# Patient Record
Sex: FEMALE | Race: WHITE | NOT HISPANIC OR LATINO | Employment: FULL TIME | URBAN - METROPOLITAN AREA
[De-identification: names, ages, dates, MRNs, and addresses within clinical notes are randomized per-mention and may not be internally consistent; named-entity substitution may affect disease eponyms.]

---

## 2020-06-25 ENCOUNTER — LAB VISIT (OUTPATIENT)
Dept: PRIMARY CARE CLINIC | Facility: OTHER | Age: 35
End: 2020-06-25

## 2020-06-25 DIAGNOSIS — R11.0 NAUSEA: Primary | ICD-10-CM

## 2020-06-25 PROCEDURE — U0003 INFECTIOUS AGENT DETECTION BY NUCLEIC ACID (DNA OR RNA); SEVERE ACUTE RESPIRATORY SYNDROME CORONAVIRUS 2 (SARS-COV-2) (CORONAVIRUS DISEASE [COVID-19]), AMPLIFIED PROBE TECHNIQUE, MAKING USE OF HIGH THROUGHPUT TECHNOLOGIES AS DESCRIBED BY CMS-2020-01-R: HCPCS

## 2020-06-28 LAB — SARS-COV-2 RNA RESP QL NAA+PROBE: NOT DETECTED

## 2021-04-26 ENCOUNTER — PATIENT MESSAGE (OUTPATIENT)
Dept: RESEARCH | Facility: HOSPITAL | Age: 36
End: 2021-04-26

## 2021-08-06 ENCOUNTER — OFFICE VISIT (OUTPATIENT)
Dept: URGENT CARE | Facility: CLINIC | Age: 36
End: 2021-08-06
Payer: COMMERCIAL

## 2021-08-06 VITALS
SYSTOLIC BLOOD PRESSURE: 116 MMHG | HEART RATE: 78 BPM | DIASTOLIC BLOOD PRESSURE: 74 MMHG | OXYGEN SATURATION: 98 % | RESPIRATION RATE: 18 BRPM | TEMPERATURE: 98 F

## 2021-08-06 DIAGNOSIS — Z32.02 PREGNANCY EXAMINATION OR TEST, NEGATIVE RESULT: ICD-10-CM

## 2021-08-06 DIAGNOSIS — R51.9 ACUTE NONINTRACTABLE HEADACHE, UNSPECIFIED HEADACHE TYPE: ICD-10-CM

## 2021-08-06 DIAGNOSIS — J02.9 SORE THROAT: Primary | ICD-10-CM

## 2021-08-06 LAB
B-HCG UR QL: NEGATIVE
CTP QC/QA: YES
MOLECULAR STREP A: NEGATIVE
SARS-COV-2 RDRP RESP QL NAA+PROBE: NEGATIVE

## 2021-08-06 PROCEDURE — 3078F DIAST BP <80 MM HG: CPT | Mod: CPTII,S$GLB,, | Performed by: NURSE PRACTITIONER

## 2021-08-06 PROCEDURE — U0002 COVID-19 LAB TEST NON-CDC: HCPCS | Mod: QW,S$GLB,, | Performed by: NURSE PRACTITIONER

## 2021-08-06 PROCEDURE — 1160F RVW MEDS BY RX/DR IN RCRD: CPT | Mod: CPTII,S$GLB,, | Performed by: NURSE PRACTITIONER

## 2021-08-06 PROCEDURE — 3074F PR MOST RECENT SYSTOLIC BLOOD PRESSURE < 130 MM HG: ICD-10-PCS | Mod: CPTII,S$GLB,, | Performed by: NURSE PRACTITIONER

## 2021-08-06 PROCEDURE — 3074F SYST BP LT 130 MM HG: CPT | Mod: CPTII,S$GLB,, | Performed by: NURSE PRACTITIONER

## 2021-08-06 PROCEDURE — 3078F PR MOST RECENT DIASTOLIC BLOOD PRESSURE < 80 MM HG: ICD-10-PCS | Mod: CPTII,S$GLB,, | Performed by: NURSE PRACTITIONER

## 2021-08-06 PROCEDURE — U0002: ICD-10-PCS | Mod: QW,S$GLB,, | Performed by: NURSE PRACTITIONER

## 2021-08-06 PROCEDURE — 81025 URINE PREGNANCY TEST: CPT | Mod: S$GLB,,, | Performed by: NURSE PRACTITIONER

## 2021-08-06 PROCEDURE — 1160F PR REVIEW ALL MEDS BY PRESCRIBER/CLIN PHARMACIST DOCUMENTED: ICD-10-PCS | Mod: CPTII,S$GLB,, | Performed by: NURSE PRACTITIONER

## 2021-08-06 PROCEDURE — 1159F MED LIST DOCD IN RCRD: CPT | Mod: CPTII,S$GLB,, | Performed by: NURSE PRACTITIONER

## 2021-08-06 PROCEDURE — 87651 POCT STREP A MOLECULAR: ICD-10-PCS | Mod: QW,S$GLB,, | Performed by: NURSE PRACTITIONER

## 2021-08-06 PROCEDURE — 87651 STREP A DNA AMP PROBE: CPT | Mod: QW,S$GLB,, | Performed by: NURSE PRACTITIONER

## 2021-08-06 PROCEDURE — 99203 OFFICE O/P NEW LOW 30 MIN: CPT | Mod: S$GLB,CS,, | Performed by: NURSE PRACTITIONER

## 2021-08-06 PROCEDURE — 99203 PR OFFICE/OUTPT VISIT, NEW, LEVL III, 30-44 MIN: ICD-10-PCS | Mod: S$GLB,CS,, | Performed by: NURSE PRACTITIONER

## 2021-08-06 PROCEDURE — 81025 POCT URINE PREGNANCY: ICD-10-PCS | Mod: S$GLB,,, | Performed by: NURSE PRACTITIONER

## 2021-08-06 PROCEDURE — 1159F PR MEDICATION LIST DOCUMENTED IN MEDICAL RECORD: ICD-10-PCS | Mod: CPTII,S$GLB,, | Performed by: NURSE PRACTITIONER

## 2021-08-06 RX ORDER — TRIAMCINOLONE ACETONIDE 1 MG/G
OINTMENT TOPICAL
COMMUNITY
Start: 2021-02-10

## 2021-11-01 ENCOUNTER — CLINICAL SUPPORT (OUTPATIENT)
Dept: OTHER | Facility: CLINIC | Age: 36
End: 2021-11-01
Payer: COMMERCIAL

## 2021-11-01 DIAGNOSIS — Z00.8 ENCOUNTER FOR OTHER GENERAL EXAMINATION: ICD-10-CM

## 2021-11-02 VITALS — HEIGHT: 66 IN

## 2021-11-02 LAB
GLUCOSE SERPL-MCNC: 98 MG/DL (ref 60–140)
HDLC SERPL-MCNC: 62 MG/DL
POC CHOLESTEROL, LDL (DOCK): 109 MG/DL
POC CHOLESTEROL, TOTAL: 180 MG/DL
TRIGL SERPL-MCNC: 46 MG/DL

## 2022-11-16 ENCOUNTER — CLINICAL SUPPORT (OUTPATIENT)
Dept: OTHER | Facility: CLINIC | Age: 37
End: 2022-11-16
Payer: COMMERCIAL

## 2022-11-16 DIAGNOSIS — Z00.8 ENCOUNTER FOR OTHER GENERAL EXAMINATION: ICD-10-CM

## 2022-11-17 VITALS
SYSTOLIC BLOOD PRESSURE: 104 MMHG | DIASTOLIC BLOOD PRESSURE: 68 MMHG | BODY MASS INDEX: 22.98 KG/M2 | WEIGHT: 143 LBS | HEIGHT: 66 IN

## 2022-11-17 LAB
GLUCOSE SERPL-MCNC: 86 MG/DL (ref 60–140)
HDLC SERPL-MCNC: 79 MG/DL
POC CHOLESTEROL, LDL (DOCK): 80 MG/DL
POC CHOLESTEROL, TOTAL: 172 MG/DL
TRIGL SERPL-MCNC: 65 MG/DL

## 2024-01-29 DIAGNOSIS — M76.62 TENDONITIS, ACHILLES, LEFT: Primary | ICD-10-CM

## 2024-02-20 ENCOUNTER — CLINICAL SUPPORT (OUTPATIENT)
Dept: REHABILITATION | Facility: HOSPITAL | Age: 39
End: 2024-02-20
Payer: COMMERCIAL

## 2024-02-20 DIAGNOSIS — M25.60 DECREASED MOBILITY OF JOINT: Primary | ICD-10-CM

## 2024-02-20 DIAGNOSIS — M76.62 TENDONITIS, ACHILLES, LEFT: ICD-10-CM

## 2024-02-20 PROCEDURE — 97161 PT EVAL LOW COMPLEX 20 MIN: CPT

## 2024-02-20 PROCEDURE — 97110 THERAPEUTIC EXERCISES: CPT

## 2024-02-20 PROCEDURE — 97140 MANUAL THERAPY 1/> REGIONS: CPT

## 2024-02-20 NOTE — PROGRESS NOTES
[facility]   Physical Therapy Initial Evaluation      Name: Peggy Sloan  [facility] Number: [id number]    Therapy Diagnosis:   Encounter Diagnoses   Name Primary?    Tendonitis, Achilles, left     Decreased mobility of joint Yes        Physician: Dawson Baig*  Physician Orders: PT Eval and Treat   Medical Diagnosis from Referral: M76.62 (ICD-10-CM) - Tendonitis, Achilles, left   Evaluation Date: 2/20/2024  Authorization Period Expiration: 1/28/2025  Plan of Care Expiration: 4/19/2024  Progress Note Due: 3/19/2024  Date of Surgery: None  Visit # / Visits authorized: 1/ 1   FOTO: 1/3:  Precautions: Standard     Time In: 10:05 am  Time Out: 11:00 am  Total Billable Time: 55 minutes    Subjective   Date of onset: Christmas 2023  History of current condition - Peggy Moe reports: a new onset of pain in the left Achilles (mid-portion) since Christmas. Peggy notes that the pain is worse towards the end of the day, as well as days w. She endorses that the pain does not bother her in the morning, but does bother her later in the day and with more walking. She denies that she has changed her activity level recently, stating that some of her worst days have been with more sitting or standing than walking. She denies a history of psoriatic arthritis, diabetes mellitus, or having recently changed her shoes.  Falls: None  Imaging: none    Prior Therapy: None  Social History:  Lives with their spouse  Occupation: Therapist  Prior Level of Function: Independent  Current Level of Function: Independent with increased pain and decreased tolerance with standing, walking.    Pain:  Current 0/10, worst 8/10, best 0/10   Location: Left Achilles tendon  Description: Aching and Dull  Aggravating Factors: Standing and Walking (for > than 5 minutes), Evening, Stair Navigation, Twisting (to get the leash unwound)  Easing Factors:  Resting, Sitting, Stretching , Morning  Patients goals: To decrease her  "pain     Medical History:   No past medical history on file.    Surgical History:   Peggy Sloan  has no past surgical history on file.    Medications:   Peggy has a current medication list which includes the following prescription(s): copper intrauterine device and triamcinolone acetonide 0.1%.    Allergies:   Review of patient's allergies indicates:   Allergen Reactions    Penicillins     Sulfa (sulfonamide antibiotics)         Objective    Ankle Evaluation Template  Standing Posture: Peggy stands naturally in bilateral subtalar neutral position, with good medial arches, and no evidence of "too many toes" sign. From anterior to posterior view, she does demonstrate a subtle peek-a-huggins sign on the left.    Gait Assessment Notable bilateral hip adduction drop, with increased lumbar translation on the left compared to the right.      Double Leg Squat Quadriceps dominant; no significant pain      Single Leg Squat (1/4) Quadriceps dominant, no significant pain       Right Left   Single Leg Stance 10 seconds; moderate Trendelenberg hip drop 10 seconds; moderate Trendelenberg hip drop     Ankle Active Range of Motion   Right Left   Dorsiflexion (20) 7 degrees 0 degrees   Plantarflexion (50) 65 degrees 65 degrees   Inversion (20-30) 30 degrees 35 degrees   Eversion (5-10) 5 degrees 0 degrees     Joint Mobility   Right Left   Talocrural Anterior Glide Moderate hypomobility Significant hypomobility   Distal Tibiofibular Joint Mild hypermobility Moderate hypomobility   Subtalar Joint Mobility Hypomobility into eversion Hypomobility into eversion     Manual Muscle Testing   Right Left   Dorsiflexion (+Inversion) 5/5 5/5   Plantar Flexion Able to complete 10 repetitions with full range of motion  Unable to complete 1 repetition within 25% of range of motion due to pain   Inversion 4/5 4/5   Eversion 5/5 5/5   Bilateral Heel Raise: Able to complete with increased left-sided pain    Special Testing   Right Left   Straight Leg " "Raise (peroneal bias) - -   Straight Leg Raise (tibial bias) - (+) for concordant symptoms   Straight Leg Raise (sural bias) - -     Palpation: (+) Tenderness to medial aspect of the mid-portion of the Achilles    Intake Outcome Measure for FOTO Ankle Survey    Therapist reviewed FOTO scores for Peggy Sloan on 2/20/2024.   FOTO report - see Media section or FOTO account episode details.    Intake Score: 66%         Treatment     Total Treatment time (time-based codes) separate from Evaluation: 23 minutes     Peggy Moe received the treatments listed below:      therapeutic exercises to develop ROM, flexibility, and posture for 13 minutes including:  Tibial Sciatic Nerve Gliders, 15x, supine  Half-Kneeling Dorsiflexion Self-Mobilization, 15x5" hold  Self-Subtalar Joint Mobilizations    manual therapy techniques: Joint mobilizations and Manual traction were applied for 10 minutes, including:  (L) Talocrural Joint Distraction Mobilization  (L) Subtalar Eversion Manipulation, sidelying  Supine Grade V Thoracic Manipulation  L4-L5 Lumbar Gapping Manipulation      Patient Education and Home Exercises     Education provided:   - -Findings of evaluation and examination, and affect of these on plan for treatment  -Prognosis and expectations  -Role of PT and team-centered care for patient  -Home exercise program and expectations of therapy    Written Home Exercises Provided: yes. Exercises were reviewed and Peggy Moe was able to demonstrate them prior to the end of the session.  Peggy Moe demonstrated good  understanding of the education provided. See EMR under Patient Instructions for exercises provided during therapy sessions.    Assessment     Peggy is a 38 y.o. female referred to outpatient Physical Therapy with a medical diagnosis of Achilles tendinopathy. Patient presents with limitation in turning, standing, walking, stair navigation, and closed-chain plantarflexion due to decreased subtalar and " talocrural joint mobility, decreased active and passive dorsiflexion, and eversion range of motion as well as proximal hip abduction weakness and adverse neural tension of the tibial nerve that is reproductive of her familiar pain when she is standing. Upon conclusion of manual therapy and exercise to both spine and locally to the left foot, she was able to perform bilateral heel raise without pain, and ambulate and navigate stairs without pain. Peggy will benefit from a customized physical therapy plan of care  to address the aforementioned impairments in an effort to improve human function and quality of life.      Patient prognosis is Excellent.   Patient will benefit from skilled outpatient Physical Therapy to address the deficits stated above and in the chart below, provide patient /family education, and to maximize patientt's level of independence.     Plan of care discussed with patient: Yes  Patient's spiritual, cultural and educational needs considered and patient is agreeable to the plan of care and goals as stated below:     Anticipated Barriers for therapy: None    Medical Necessity is demonstrated by the following  History  Co-morbidities and personal factors that may impact the plan of care [x] LOW: no personal factors / co-morbidities  [] MODERATE: 1-2 personal factors / co-morbidities  [] HIGH: 3+ personal factors / co-morbidities    Moderate / High Support Documentation:   Co-morbidities affecting plan of care: None    Personal Factors:   no deficits     Examination  Body Structures and Functions, activity limitations and participation restrictions that may impact the plan of care [x] LOW: addressing 1-2 elements  [] MODERATE: 3+ elements  [] HIGH: 4+ elements (please support below)    Moderate / High Support Documentation: None     Clinical Presentation [x] LOW: stable  [] MODERATE: Evolving  [] HIGH: Unstable     Decision Making/ Complexity Score: low       Goals:  Short Term Goals: 2 weeks   1.)  Patient will demonstrate independence in compliance and technique of home exercise program provided as per teach-back method of assessment.  2.) Patient will demonstrate a 8-point improvement as per FOTO score to demonstrate improvements in perceived functional ability.  3.) Peggy will note a pain level of no greater than 2/10 after 20 minutes of ambulating her dog.  4.) Peggy will navigate 3 flights of stairs without any increased pain >2/10.      Long Term Goals: 4 weeks   1.) Patient will demonstrate independence in compliance and technique of home exercise program provided as per teach-back method of assessment.  2.) Patient will demonstrate a 8-point improvement as per FOTO score to demonstrate improvements in perceived functional ability.  3.) Peggy will note a pain level of no greater than 2/10 after 20 minutes of ambulating her dog.  4.) Peggy will navigate 3 flights of stairs without any increased pain >2/10.    Plan     Plan of care Certification: 2/20/2024 to 4/20/2024.    Outpatient Physical Therapy 1 times weekly for 4 weeks to include the following interventions: Manual Therapy, Moist Heat/ Ice, Neuromuscular Re-ed, Patient Education, Self Care, Therapeutic Activities, and Therapeutic Exercise.     Ann Valdovinos, PT DPT  Board Certified in Orthopedic Physical Therapy          Physician's Signature: _________________________________________ Date: ________________

## 2024-02-20 NOTE — PLAN OF CARE
[facility]   Physical Therapy Initial Evaluation      Name: Peggy Sloan  [facility] Number: [id number]    Therapy Diagnosis:   Encounter Diagnoses   Name Primary?    Tendonitis, Achilles, left     Decreased mobility of joint Yes        Physician: Dawson Baig*  Physician Orders: PT Eval and Treat   Medical Diagnosis from Referral: M76.62 (ICD-10-CM) - Tendonitis, Achilles, left   Evaluation Date: 2/20/2024  Authorization Period Expiration: 1/28/2025  Plan of Care Expiration: 4/19/2024  Progress Note Due: 3/19/2024  Date of Surgery: None  Visit # / Visits authorized: 1/ 1   FOTO: 1/3:  Precautions: Standard     Time In: 10:05 am  Time Out: 11:00 am  Total Billable Time: 55 minutes    Subjective   Date of onset: Christmas 2023  History of current condition - Peggy Moe reports: a new onset of pain in the left Achilles (mid-portion) since Christmas. Peggy notes that the pain is worse towards the end of the day, as well as days w. She endorses that the pain does not bother her in the morning, but does bother her later in the day and with more walking. She denies that she has changed her activity level recently, stating that some of her worst days have been with more sitting or standing than walking. She denies a history of psoriatic arthritis, diabetes mellitus, or having recently changed her shoes.  Falls: None  Imaging: none    Prior Therapy: None  Social History:  Lives with their spouse  Occupation: Therapist  Prior Level of Function: Independent  Current Level of Function: Independent with increased pain and decreased tolerance with standing, walking.    Pain:  Current 0/10, worst 8/10, best 0/10   Location: Left Achilles tendon  Description: Aching and Dull  Aggravating Factors: Standing and Walking (for > than 5 minutes), Evening, Stair Navigation, Twisting (to get the leash unwound)  Easing Factors: Resting, Sitting, Stretching, Morning  Patients goals: To decrease her  "pain     Medical History:   No past medical history on file.    Surgical History:   Peggy Sloan  has no past surgical history on file.    Medications:   Peggy has a current medication list which includes the following prescription(s): copper intrauterine device and triamcinolone acetonide 0.1%.    Allergies:   Review of patient's allergies indicates:   Allergen Reactions    Penicillins     Sulfa (sulfonamide antibiotics)         Objective    Ankle Evaluation Template  Standing Posture: Peggy stands naturally in bilateral subtalar neutral position, with good medial arches, and no evidence of "too many toes" sign. From anterior to posterior view, she does demonstrate a subtle peek-a-huggins sign on the left.    Gait Assessment Notable bilateral hip adduction drop, with increased lumbar translation on the left compared to the right.      Double Leg Squat Quadriceps dominant; no significant pain      Single Leg Squat (1/4) Quadriceps dominant, no significant pain       Right Left   Single Leg Stance 10 seconds; moderate Trendelenberg hip drop 10 seconds; moderate Trendelenberg hip drop     Ankle Active Range of Motion   Right Left   Dorsiflexion (20) 7 degrees 0 degrees   Plantarflexion (50) 65 degrees 65 degrees   Inversion (20-30) 30 degrees 35 degrees   Eversion (5-10) 5 degrees 0 degrees     Joint Mobility   Right Left   Talocrural Anterior Glide Moderate hypomobility Significant hypomobility   Distal Tibiofibular Joint Mild hypermobility Moderate hypomobility   Subtalar Joint Mobility Hypomobility into eversion Hypomobility into eversion     Manual Muscle Testing   Right Left   Dorsiflexion (+Inversion) 5/5 5/5   Plantar Flexion Able to complete 10 repetitions with full range of motion  Unable to complete 1 repetition within 25% of range of motion due to pain   Inversion 4/5 4/5   Eversion 5/5 5/5   Bilateral Heel Raise: Able to complete with increased left-sided pain    Special Testing   Right Left   Straight Leg " "Raise (peroneal bias) - -   Straight Leg Raise (tibial bias) - (+) for concordant symptoms   Straight Leg Raise (sural bias) - -     Palpation: (+) Tenderness to medial aspect of the mid-portion of the Achilles    Intake Outcome Measure for FOTO Ankle Survey    Therapist reviewed FOTO scores for Peggy Sloan on 2/20/2024.   FOTO report - see Media section or FOTO account episode details.    Intake Score: 66%         Treatment     Total Treatment time (time-based codes) separate from Evaluation: 23 minutes     Peggy Moe received the treatments listed below:      therapeutic exercises to develop ROM, flexibility, and posture for 13 minutes including:  Tibial Sciatic Nerve Gliders, 15x, supine  Half-Kneeling Dorsiflexion Self-Mobilization, 15x5" hold  Self-Subtalar Joint Mobilizations    manual therapy techniques: Joint mobilizations and Manual traction were applied for 10 minutes, including:  (L) Talocrural Joint Distraction Mobilization  (L) Subtalar Eversion Manipulation, sidelying  Supine Grade V Thoracic Manipulation  L4-L5 Lumbar Gapping Manipulation      Patient Education and Home Exercises     Education provided:   - -Findings of evaluation and examination, and affect of these on plan for treatment  -Prognosis and expectations  -Role of PT and team-centered care for patient  -Home exercise program and expectations of therapy    Written Home Exercises Provided: yes. Exercises were reviewed and Peggy Moe was able to demonstrate them prior to the end of the session.  Peggy Moe demonstrated good  understanding of the education provided. See EMR under Patient Instructions for exercises provided during therapy sessions.    Assessment     Peggy is a 38 y.o. female referred to outpatient Physical Therapy with a medical diagnosis of Achilles tendinopathy. Patient presents with limitation in turning, standing, walking, stair navigation, and closed-chain plantarflexion due to decreased subtalar and " talocrural joint mobility, decreased active and passive dorsiflexion, and eversion range of motion as well as proximal hip abduction weakness and adverse neural tension of the tibial nerve that is reproductive of her familiar pain when she is standing. Upon conclusion of manual therapy and exercise to both spine and locally to the left foot, she was able to perform bilateral heel raise without pain, and ambulate and navigate stairs without pain. Peggy will benefit from a customized physical therapy plan of care  to address the aforementioned impairments in an effort to improve human function and quality of life.      Patient prognosis is Excellent.   Patient will benefit from skilled outpatient Physical Therapy to address the deficits stated above and in the chart below, provide patient /family education, and to maximize patientt's level of independence.     Plan of care discussed with patient: Yes  Patient's spiritual, cultural and educational needs considered and patient is agreeable to the plan of care and goals as stated below:     Anticipated Barriers for therapy: None    Medical Necessity is demonstrated by the following  History  Co-morbidities and personal factors that may impact the plan of care [x] LOW: no personal factors / co-morbidities  [] MODERATE: 1-2 personal factors / co-morbidities  [] HIGH: 3+ personal factors / co-morbidities    Moderate / High Support Documentation:   Co-morbidities affecting plan of care: None    Personal Factors:   no deficits     Examination  Body Structures and Functions, activity limitations and participation restrictions that may impact the plan of care [x] LOW: addressing 1-2 elements  [] MODERATE: 3+ elements  [] HIGH: 4+ elements (please support below)    Moderate / High Support Documentation: None     Clinical Presentation [x] LOW: stable  [] MODERATE: Evolving  [] HIGH: Unstable     Decision Making/ Complexity Score: low       Goals:  Short Term Goals: 2 weeks   1.)  Patient will demonstrate independence in compliance and technique of home exercise program provided as per teach-back method of assessment.  2.) Patient will demonstrate a 8-point improvement as per FOTO score to demonstrate improvements in perceived functional ability.  3.) Peggy will note a pain level of no greater than 2/10 after 20 minutes of ambulating her dog.  4.) Epggy will navigate 3 flights of stairs without any increased pain >2/10.      Long Term Goals: 4 weeks   1.) Patient will demonstrate independence in compliance and technique of home exercise program provided as per teach-back method of assessment.  2.) Patient will demonstrate a 8-point improvement as per FOTO score to demonstrate improvements in perceived functional ability.  3.) Peggy will note a pain level of no greater than 2/10 after 20 minutes of ambulating her dog.  4.) Peggy will navigate 3 flights of stairs without any increased pain >2/10.    Plan     Plan of care Certification: 2/20/2024 to 4/20/2024.    Outpatient Physical Therapy 1 times weekly for 4 weeks to include the following interventions: Manual Therapy, Moist Heat/ Ice, Neuromuscular Re-ed, Patient Education, Self Care, Therapeutic Activities, and Therapeutic Exercise.     Ann Valdovinos, PT DPT  Board Certified in Orthopedic Physical Therapy          Physician's Signature: _________________________________________ Date: ________________

## 2024-03-04 ENCOUNTER — CLINICAL SUPPORT (OUTPATIENT)
Dept: REHABILITATION | Facility: HOSPITAL | Age: 39
End: 2024-03-04
Payer: COMMERCIAL

## 2024-03-04 DIAGNOSIS — M76.62 TENDONITIS, ACHILLES, LEFT: Primary | ICD-10-CM

## 2024-03-04 DIAGNOSIS — M25.60 DECREASED MOBILITY OF JOINT: ICD-10-CM

## 2024-03-04 PROCEDURE — 97112 NEUROMUSCULAR REEDUCATION: CPT

## 2024-03-04 PROCEDURE — 97140 MANUAL THERAPY 1/> REGIONS: CPT

## 2024-03-04 PROCEDURE — 97110 THERAPEUTIC EXERCISES: CPT

## 2024-03-04 NOTE — PROGRESS NOTES
"  Physical Therapy Daily Treatment Note     Name: Peggy Sloan  Clinic Number: [id number]    Therapy Diagnosis:   Encounter Diagnoses   Name Primary?    Tendonitis, Achilles, left Yes    Decreased mobility of joint      Physician: Dawson Baig II([place]*    Visit Date: 3/4/2024  Physician: Dawson Baig*  Physician Orders: PT Eval and Treat   Medical Diagnosis from Referral: M76.62 (ICD-10-CM) - Tendonitis, Achilles, left   Evaluation Date: 2/20/2024  Authorization Period Expiration: 1/28/2025  Plan of Care Expiration: 4/19/2024  Progress Note Due: 3/19/2024  Date of Surgery: None  Visit # / Visits authorized: 1/ 1   FOTO: 1/3:  Precautions: Standard   Time In: 10:00 am  Time Out: 10:55 am  Total Billable Time: 55 minutes  Precautions: Standard  Subjective     Pt reports: her ankle has been feeling okay overall, but she notes that she was attempting to walk up the stairs with a burden, and must've "stepped wrong," because when she went to take the dog for a walk, she had to stop.  She was compliant with home exercise program. (90% of the time)  Response to previous treatment: Initial evaluation  Functional change: Ongoing    Pain: 1/10  Location: Left ankles   Objective   Single Leg Heel Raise: 2/10 pain after 5 repetitions  Straight-Leg Raise Test (tibial nerve bias): (+) with internal rotation and adduction     Peggy Moe received therapeutic exercises to develop strength, endurance, and ROM for 25 minutes including:  Tibial Sciatic Nerve Gliders, 15x, supine (review for Home exercise program)  Half-Kneeling Dorsiflexion Self-Mobilization, 15x5" hold  Eccentric Ankle Dorsiflexion, on step, 2x12  Standing Bilateral Heel Raises, 2x12 (increased pain with attempts at eccentric --> regressed to bilateral stance with 50% weight on left side)    Peggy Moe received the following manual therapy techniques: Joint mobilizations and Manual traction were applied for 22 minutes, including:  (L) Talocrural " Joint Distraction Mobilization, Grade V  (L) Subtalar Eversion Whip Manipulation  Anterior to Posterior and Posterior to Anterior GH Joint Mobilizations, Grade IV  Great Toe Mobilization, Grade IV    Peggy Moe participated in neuromuscular re-education activities to improve: Balance, Coordination, Kinesthetic, Sense, Proprioception, and Posture for 8 minutes. The following activities were included:  Clamshells, red band, 2x12, bilateral    Peggy Moe participated in dynamic functional therapeutic activities to improve functional performance for 00  minutes, includin    Home Exercises Provided and Patient Education Provided     Education provided:   - Home exercise program     Written Home Exercises Provided: Patient instructed to cont prior HEP.  Exercises were reviewed and Peggy Moe was able to demonstrate them prior to the end of the session.  Peggy Moe demonstrated good  understanding of the education provided.     See EMR under Patient Instructions for exercises provided prior visit.    Assessment   Peggy Moe demonstrates less irritability of symptoms this visit, in which stairs and ambulation do not provoke pain, and multiple repetition of heel raises prior to onset of pain. During ambulation, the heel maintains varus position with decreased eversion and ankle dorsiflexion during weightbearing. Adverse neural tension was positive for concordant pain with tibial nerve bias that improved after manual therapy interventions. Most provocating activity was eccentric heel raise.    Peggy Moe Is progressing well towards her goals.   Pt prognosis is Good.     Pt will continue to benefit from skilled outpatient physical therapy to address the deficits listed in the problem list box on initial evaluation, provide pt/family education and to maximize pt's level of independence in the home and community environment.     Pt's spiritual, cultural and educational needs considered and pt  agreeable to plan of care and goals.     Anticipated barriers to physical therapy: None    Goals: Short Term Goals: 2 weeks   1.) Patient will demonstrate independence in compliance and technique of home exercise program provided as per teach-back method of assessment.  2.) Patient will demonstrate a 8-point improvement as per FOTO score to demonstrate improvements in perceived functional ability.  3.) Peggy will note a pain level of no greater than 2/10 after 20 minutes of ambulating her dog.  4.) Peggy will navigate 3 flights of stairs without any increased pain >2/10.        Long Term Goals: 4 weeks   1.) Patient will demonstrate independence in compliance and technique of home exercise program provided as per teach-back method of assessment.  2.) Patient will demonstrate a 8-point improvement as per FOTO score to demonstrate improvements in perceived functional ability.  3.) Peggy will note a pain level of no greater than 2/10 after 20 minutes of ambulating her dog.  4.) Peggy will navigate 3 flights of stairs without any increased pain >2/10.  Plan     Continue to progress as per plan of care.    Ann Valdovinos, PT DPT  Board Certified in Orthopedic Physical Therapy

## 2024-03-11 ENCOUNTER — CLINICAL SUPPORT (OUTPATIENT)
Dept: REHABILITATION | Facility: HOSPITAL | Age: 39
End: 2024-03-11
Payer: COMMERCIAL

## 2024-03-11 DIAGNOSIS — M25.60 DECREASED MOBILITY OF JOINT: Primary | ICD-10-CM

## 2024-03-11 PROCEDURE — 97112 NEUROMUSCULAR REEDUCATION: CPT

## 2024-03-11 PROCEDURE — 97110 THERAPEUTIC EXERCISES: CPT

## 2024-03-11 PROCEDURE — 97140 MANUAL THERAPY 1/> REGIONS: CPT

## 2024-03-11 NOTE — PROGRESS NOTES
"   Physical Therapy Daily Treatment Note     Name: Peggy Sloan  Clinic Number: [id number]  Therapy Diagnosis:   Encounter Diagnosis   Name Primary?    Decreased mobility of joint Yes     Physician: Dawson Baig II([place]*  Visit Date: 3/11/2024  Physician: Dawson Baig*  Physician Orders: PT Eval and Treat   Medical Diagnosis from Referral: M76.62 (ICD-10-CM) - Tendonitis, Achilles, left   Evaluation Date: 2/20/2024  Authorization Period Expiration: 1/28/2025  Plan of Care Expiration: 4/19/2024  Progress Note Due: 3/19/2024  Date of Surgery: None  Visit # / Visits authorized: 2/ 20   FOTO: 1/3:  Precautions: Standard   Time In: 10:00 am  Time Out: 10:55 am  Total Billable Time: 55 minutes  Precautions: Standard  Subjective   Pt reports: she brought her shoes that she feels may have been causing some of her ankle pain. She has not been having as much pain as before, as it has not gone up from a 4/10 since our last visit.  She was compliant with home exercise program. (90% of the time)  Response to previous treatment: Initial evaluation  Functional change: Ongoing  Pain: 1/10  Location: Left ankles   Objective   Flick Test: (+) for hypomobility on the left side  Sacral Rocking Mobilizations: (+) for hypomobility at L5-S1  Straight-Leg Raise Test (tibial nerve bias): (+) with internal rotation and adduction     Peggy Moe received therapeutic exercises to develop strength, endurance, and ROM for 10 minutes including:  Half-Kneeling Dorsiflexion Self-Mobilization, 15x5" hold  Eccentric Ankle Dorsiflexion, on step, 2x12  Standing Bilateral Heel Raises, 2x12 , 75% of range of motion     Peggy Moe received the following manual therapy techniques: Joint mobilizations and Manual traction were applied for 25 minutes, including:  (L) Talocrural Joint Distraction Mobilization, Grade V  (L) Subtalar Eversion Sidelying Manipulation  Anterior to Posterior and Posterior to Anterior GH Joint Mobilizations, " "Grade IV  Great Toe Mobilization, Grade IV  L5-S1 Manipulation, Grade V (left)  (L) SIJ Prone Manipulation (L)    Peggy Moe participated in neuromuscular re-education activities to improve: Balance, Coordination, Kinesthetic, Sense, Proprioception, and Posture for 20 minutes. The following activities were included:  Clamshells, red band, 2x12, bilateral  Bridge, blue band, 2x12  Paloff Press, green band, 10x5" hold each side    Peggy Moe participated in dynamic functional therapeutic activities to improve functional performance for 00  minutes, includin    Home Exercises Provided and Patient Education Provided     Education provided:   - Home exercise program     Written Home Exercises Provided: Patient instructed to cont prior HEP.  Exercises were reviewed and Peggy Moe was able to demonstrate them prior to the end of the session.  Peggy Moe demonstrated good  understanding of the education provided.     See EMR under Patient Instructions for exercises provided prior visit.    Assessment   Peggy Moe continues to demonstrate (+) adverse tibial nerve tension with testing assessment, and continues to exhibit decreased subtalar joint and talocrural joint mobility, along with great toe mobility. Reproduction of symptoms continues to be greatest with heel raises, even at ~75% of range of motion. Proximal regions were explored for deficits this visit and it was noted that L5-S1 and left-sided SIJ mobility was also hypomobile and addressed with manual therapy. Upon conclusion, ME noted no pain in her foot after intervention to the lumbar spine and SIJ.    Peggy Moe Is progressing well towards her goals.   Pt prognosis is Good.     Pt will continue to benefit from skilled outpatient physical therapy to address the deficits listed in the problem list box on initial evaluation, provide pt/family education and to maximize pt's level of independence in the home and community environment. "     Pt's spiritual, cultural and educational needs considered and pt agreeable to plan of care and goals.     Anticipated barriers to physical therapy: None    Goals: Short Term Goals: 2 weeks   1.) Patient will demonstrate independence in compliance and technique of home exercise program provided as per teach-back method of assessment.  2.) Patient will demonstrate a 8-point improvement as per FOTO score to demonstrate improvements in perceived functional ability.  3.) Peggy will note a pain level of no greater than 2/10 after 20 minutes of ambulating her dog.  4.) Peggy will navigate 3 flights of stairs without any increased pain >2/10.        Long Term Goals: 4 weeks   1.) Patient will demonstrate independence in compliance and technique of home exercise program provided as per teach-back method of assessment.  2.) Patient will demonstrate a 8-point improvement as per FOTO score to demonstrate improvements in perceived functional ability.  3.) Peggy will note a pain level of no greater than 2/10 after 20 minutes of ambulating her dog.  4.) Peggy will navigate 3 flights of stairs without any increased pain >2/10.  Plan     Continue to progress as per plan of care.    Ann Valdovinos, PT DPT  Board Certified in Orthopedic Physical Therapy

## 2024-03-18 ENCOUNTER — CLINICAL SUPPORT (OUTPATIENT)
Dept: REHABILITATION | Facility: HOSPITAL | Age: 39
End: 2024-03-18
Payer: COMMERCIAL

## 2024-03-18 DIAGNOSIS — M25.60 DECREASED MOBILITY OF JOINT: Primary | ICD-10-CM

## 2024-03-18 PROCEDURE — 97140 MANUAL THERAPY 1/> REGIONS: CPT

## 2024-03-18 PROCEDURE — 97112 NEUROMUSCULAR REEDUCATION: CPT

## 2024-03-18 PROCEDURE — 97110 THERAPEUTIC EXERCISES: CPT

## 2024-03-18 NOTE — PROGRESS NOTES
Physical Therapy Daily Treatment and Progress Note     Name: Peggy Sloan  Clinic Number: [id number]  Therapy Diagnosis:   Encounter Diagnosis   Name Primary?    Decreased mobility of joint Yes       Physician: Dawson Baig*  Visit Date: 3/18/2024  Physician: Dawson Baig*  Physician Orders: PT Eval and Treat   Medical Diagnosis from Referral: M76.62 (ICD-10-CM) - Tendonitis, Achilles, left   Evaluation Date: 2/20/2024  Authorization Period Expiration: 1/28/2025  Plan of Care Expiration: 4/19/2024  Progress Note Due: 4/17/2024  Most Recent Progress Note: 3/18/2024  Date of Surgery: None  Visit # / Visits authorized: 3/ 20   FOTO: 1/3:  Precautions: Standard   Time In: 4:05 pm  Time Out: 5:00 pm  Total Billable Time: 55 minutes  Precautions: Standard  Subjective   Pt reports:going to a wedding in [place], and wore small, wedge heels that brought on the familiar symptoms of the ankle. She denies experiencing greater pain while on the plane.  She was compliant with home exercise program. (90% of the time)  Response to previous treatment: No adverse effect  Functional change: Ongoing  Pain: 1/10  Location: Left ankles   Objective    Ankle Active Range of Motion    Right Left   Dorsiflexion (20) 7 degrees 2-3 degrees   Plantarflexion (50) 65 degrees 65 degrees   Inversion (20-30) 30 degrees 35 degrees   Eversion (5-10) 5 degrees 5 degrees      Joint Mobility    Right Left   Talocrural Anterior Glide Moderate hypomobility Significant hypomobility   Distal Tibiofibular Joint Mild hypermobility Moderate hypomobility   Subtalar Joint Mobility Hypomobility into eversion Hypomobility into eversion   Great Toe Extension Mobility Hypomobility Significant Hypomobility      Special Testing    Right Left   Straight Leg Raise (peroneal bias) - -   Straight Leg Raise (tibial bias) - (-) for concordant symptoms   Straight Leg Raise (sural bias) - -      Posterior Gluteus Medius: 3/5, bilaterally  Gluteus  Mammogram is normal.  Repeat in 1 year.  Sarah Barriga M.D.   "Larry: 3-/5 on left side; 4/5 on right side    Peggy Moe received therapeutic exercises to develop strength, endurance, and ROM for 14 minutes including:  Half-Kneeling Dorsiflexion Self-Mobilization, 15x5" hold  Eccentric Ankle Dorsiflexion, on step, 2x12  Standing Bilateral Heel Raises, 2x12 , 75% of range of motion     Peggy Moe received the following manual therapy techniques: Joint mobilizations and Manual traction were applied for 26 minutes, including:  (L) Talocrural Joint Distraction Mobilization, Grade V  (L) Subtalar Eversion Sidelying Manipulation  Great Toe Mobilization, Grade IV  Grade V Medial Cuneiform Manipulation  Objective measures taken (see above)    Peggy Moe participated in neuromuscular re-education activities to improve: Balance, Coordination, Kinesthetic, Sense, Proprioception, and Posture for 15 minutes. The following activities were included:  Clamshells, yellow band, 2x12, bilateral, 5" hold  Bridge with Marching 2x12    Peggy Moe participated in dynamic functional therapeutic activities to improve functional performance for 00  minutes, includin    Home Exercises Provided and Patient Education Provided     Education provided:   - Home exercise program     Written Home Exercises Provided: Patient instructed to cont prior HEP.  Exercises were reviewed and Peggy Moe was able to demonstrate them prior to the end of the session.  Peggy Moe demonstrated good  understanding of the education provided.     See EMR under Patient Instructions for exercises provided prior visit.    Assessment   Peggy Moe demonstrates less pain during ambulation after talocrural and subtalar manipulations for mobility. Concordant symptoms do continue to be present with single leg heel raise albeit minimal after manual therapy. Peggy Moe will continue to benefit from a customized physical therapy plan of care  to address the aforementioned impairments in an effort to " improve human function and quality of life.      Peggy Moe Is progressing well towards her goals.   Pt prognosis is Good.     Pt will continue to benefit from skilled outpatient physical therapy to address the deficits listed in the problem list box on initial evaluation, provide pt/family education and to maximize pt's level of independence in the home and community environment.     Pt's spiritual, cultural and educational needs considered and pt agreeable to plan of care and goals.     Anticipated barriers to physical therapy: None    Goals: Short Term Goals: 2 weeks   1.) Patient will demonstrate independence in compliance and technique of home exercise program provided as per teach-back method of assessment.  2.) Patient will demonstrate a 8-point improvement as per FOTO score to demonstrate improvements in perceived functional ability.  3.) Peggy will note a pain level of no greater than 2/10 after 20 minutes of ambulating her dog.  4.) Peggy will navigate 3 flights of stairs without any increased pain >2/10.        Long Term Goals: 4 weeks   1.) Patient will demonstrate independence in compliance and technique of home exercise program provided as per teach-back method of assessment.  2.) Patient will demonstrate a 8-point improvement as per FOTO score to demonstrate improvements in perceived functional ability.  3.) Peggy will note a pain level of no greater than 2/10 after 20 minutes of ambulating her dog.  4.) Peggy will navigate 3 flights of stairs without any increased pain >2/10.  Plan     Continue to progress as per plan of care.    Ann Valdovinos, PT DPT  Board Certified in Orthopedic Physical Therapy

## 2024-03-25 ENCOUNTER — CLINICAL SUPPORT (OUTPATIENT)
Dept: REHABILITATION | Facility: HOSPITAL | Age: 39
End: 2024-03-25
Payer: COMMERCIAL

## 2024-03-25 DIAGNOSIS — M25.60 DECREASED MOBILITY OF JOINT: Primary | ICD-10-CM

## 2024-03-25 PROCEDURE — 97110 THERAPEUTIC EXERCISES: CPT

## 2024-03-25 PROCEDURE — 97112 NEUROMUSCULAR REEDUCATION: CPT

## 2024-03-25 PROCEDURE — 97140 MANUAL THERAPY 1/> REGIONS: CPT

## 2024-03-25 NOTE — PLAN OF CARE
Physical Therapy Daily Treatment and Progress Note     Name: Peggy Sloan  Clinic Number: [id number]  Therapy Diagnosis:   Encounter Diagnosis   Name Primary?    Decreased mobility of joint Yes       Physician: Dawson Baig*  Visit Date: 3/18/2024  Physician: Dawson Baig*  Physician Orders: PT Eval and Treat   Medical Diagnosis from Referral: M76.62 (ICD-10-CM) - Tendonitis, Achilles, left   Evaluation Date: 2/20/2024  Authorization Period Expiration: 1/28/2025  Plan of Care Expiration: 4/19/2024  Progress Note Due: 4/17/2024  Most Recent Progress Note: 3/18/2024  Date of Surgery: None  Visit # / Visits authorized: 3/ 20   FOTO: 1/3:  Precautions: Standard   Time In: 4:05 pm  Time Out: 5:00 pm  Total Billable Time: 55 minutes  Precautions: Standard  Subjective   Pt reports:going to a wedding in [place], and wore small, wedge heels that brought on the familiar symptoms of the ankle. She denies experiencing greater pain while on the plane.  She was compliant with home exercise program. (90% of the time)  Response to previous treatment: No adverse effect  Functional change: Ongoing  Pain: 1/10  Location: Left ankles   Objective    Ankle Active Range of Motion    Right Left   Dorsiflexion (20) 7 degrees 2-3 degrees   Plantarflexion (50) 65 degrees 65 degrees   Inversion (20-30) 30 degrees 35 degrees   Eversion (5-10) 5 degrees 5 degrees      Joint Mobility    Right Left   Talocrural Anterior Glide Moderate hypomobility Significant hypomobility   Distal Tibiofibular Joint Mild hypermobility Moderate hypomobility   Subtalar Joint Mobility Hypomobility into eversion Hypomobility into eversion   Great Toe Extension Mobility Hypomobility Significant Hypomobility      Special Testing    Right Left   Straight Leg Raise (peroneal bias) - -   Straight Leg Raise (tibial bias) - (-) for concordant symptoms   Straight Leg Raise (sural bias) - -      Posterior Gluteus Medius: 3/5, bilaterally  Gluteus  "Larry: 3-/5 on left side; 4/5 on right side    Peggy Moe received therapeutic exercises to develop strength, endurance, and ROM for 14 minutes including:  Half-Kneeling Dorsiflexion Self-Mobilization, 15x5" hold  Eccentric Ankle Dorsiflexion, on step, 2x12  Standing Bilateral Heel Raises, 2x12 , 75% of range of motion     Peggy Moe received the following manual therapy techniques: Joint mobilizations and Manual traction were applied for 26 minutes, including:  (L) Talocrural Joint Distraction Mobilization, Grade V  (L) Subtalar Eversion Sidelying Manipulation  Great Toe Mobilization, Grade IV  Grade V Medial Cuneiform Manipulation  Objective measures taken (see above)    Peggy Moe participated in neuromuscular re-education activities to improve: Balance, Coordination, Kinesthetic, Sense, Proprioception, and Posture for 15 minutes. The following activities were included:  Clamshells, yellow band, 2x12, bilateral, 5" hold  Bridge with Marching 2x12    Peggy Moe participated in dynamic functional therapeutic activities to improve functional performance for 00  minutes, includin    Home Exercises Provided and Patient Education Provided     Education provided:   - Home exercise program     Written Home Exercises Provided: Patient instructed to cont prior HEP.  Exercises were reviewed and Peggy Moe was able to demonstrate them prior to the end of the session.  Peggy Moe demonstrated good  understanding of the education provided.     See EMR under Patient Instructions for exercises provided prior visit.    Assessment   Peggy Moe demonstrates less pain during ambulation after talocrural and subtalar manipulations for mobility. Concordant symptoms do continue to be present with single leg heel raise albeit minimal after manual therapy. Peggy Moe will continue to benefit from a customized physical therapy plan of care  to address the aforementioned impairments in an effort to " improve human function and quality of life.      Peggy Moe Is progressing well towards her goals.   Pt prognosis is Good.     Pt will continue to benefit from skilled outpatient physical therapy to address the deficits listed in the problem list box on initial evaluation, provide pt/family education and to maximize pt's level of independence in the home and community environment.     Pt's spiritual, cultural and educational needs considered and pt agreeable to plan of care and goals.     Anticipated barriers to physical therapy: None    Goals: Short Term Goals: 2 weeks   1.) Patient will demonstrate independence in compliance and technique of home exercise program provided as per teach-back method of assessment.  2.) Patient will demonstrate a 8-point improvement as per FOTO score to demonstrate improvements in perceived functional ability.  3.) Peggy will note a pain level of no greater than 2/10 after 20 minutes of ambulating her dog.  4.) Peggy will navigate 3 flights of stairs without any increased pain >2/10.        Long Term Goals: 4 weeks   1.) Patient will demonstrate independence in compliance and technique of home exercise program provided as per teach-back method of assessment.  2.) Patient will demonstrate a 8-point improvement as per FOTO score to demonstrate improvements in perceived functional ability.  3.) Peggy will note a pain level of no greater than 2/10 after 20 minutes of ambulating her dog.  4.) Peggy will navigate 3 flights of stairs without any increased pain >2/10.  Plan     Continue to progress as per plan of care.    Ann Valdovinos, PT DPT  Board Certified in Orthopedic Physical Therapy

## 2024-03-25 NOTE — PROGRESS NOTES
"   Physical Therapy Daily Treatment Note     Name: Peggy Sloan  Clinic Number: [id number]  Therapy Diagnosis:   Encounter Diagnosis   Name Primary?    Decreased mobility of joint Yes     Physician: Dawson Baig*  Visit Date: 3/25/2024  Physician: Dawson Baig*  Physician Orders: PT Eval and Treat   Medical Diagnosis from Referral: M76.62 (ICD-10-CM) - Tendonitis, Achilles, left   Evaluation Date: 2/20/2024  Authorization Period Expiration: 1/28/2025  Plan of Care Expiration: 4/19/2024  Progress Note Due: 4/17/2024  Most Recent Progress Note: 3/18/2024  Date of Surgery: None  Visit # / Visits authorized: 4/ 20   FOTO: 1/3:  Precautions: Standard   Time In: 2:00 pm  Time Out: 3:00 pm  Total Billable Time: 60 minutes  Precautions: Standard  Subjective   Pt reports: pain has now shifted from being in the "back" of the heel to being in the front of the foot (anterolateral ankle, near the ATFL)  She was compliant with home exercise program. (90% of the time)  Response to previous treatment: No adverse effect  Functional change: Ongoing  Pain: 1/10  Location: Left ankles   Objective    Ankle Active Range of Motion    Right Left   Dorsiflexion (20) 7 degrees 2-3 degrees   Plantarflexion (50) 65 degrees 65 degrees   Inversion (20-30) 30 degrees 35 degrees   Eversion (5-10) 5 degrees 5 degrees      Joint Mobility    Right Left   Talocrural Anterior Glide Moderate hypomobility Significant hypomobility   Distal Tibiofibular Joint Mild hypermobility Moderate hypomobility   Subtalar Joint Mobility Hypomobility into eversion Hypomobility into eversion   Great Toe Extension Mobility Hypomobility Significant Hypomobility      Special Testing    Right Left   Straight Leg Raise (peroneal bias) - -   Straight Leg Raise (tibial bias) - (-) for concordant symptoms   Straight Leg Raise (sural bias) - -      Posterior Gluteus Medius: 3/5, bilaterally  Gluteus Larry: 3-/5 on left side; 4/5 on right side    Peggy " "Payal received therapeutic exercises to develop strength, endurance, and ROM for 18 minutes including:  Half-Kneeling Dorsiflexion Self-Mobilization, 15x5" hold  Eccentric Ankle Dorsiflexion, on step, 2x12  Standing Bilateral Heel Raises, 2x12 , 75% of range of motion     Peggy Moe received the following manual therapy techniques: Joint mobilizations and Manual traction were applied for 24 minutes, including:  (L) Talocrural Joint Distraction Mobilization, Grade V  (L) Subtalar Eversion Sidelying Manipulation  Great Toe Mobilization, Grade IV  Grade V Medial Cuneiform Manipulation  Objective measures taken (see above)    Peggy Moe participated in neuromuscular re-education activities to improve: Balance, Coordination, Kinesthetic, Sense, Proprioception, and Posture for 18 minutes. The following activities were included:  Clamshells, yellow band, 2x12, bilateral, 5" hold  Bridge with Marching 2x12  Arch Doming    Peggy Moe participated in dynamic functional therapeutic activities to improve functional performance for 00  minutes, includin    Home Exercises Provided and Patient Education Provided     Education provided:   - Home exercise program     Written Home Exercises Provided: Patient instructed to cont prior HEP.  Exercises were reviewed and Peggy Moe was able to demonstrate them prior to the end of the session.  Peggy Moe demonstrated good  understanding of the education provided.     See EMR under Patient Instructions for exercises provided prior visit.    Assessment   Peggy Moe demonstrates significant restriction in talocrural and subtalar joint of the left ankle as compared to the right, with decreased great toe extension as well. Cuboid dysfunction was noted as well as pain with palpation and this was mobilized with thrust mobilization. She was encouraged to perform some of the exercises prior to walking with her dog to assess for change in pain.       Peggy Moe " Is progressing well towards her goals.   Pt prognosis is Good.     Pt will continue to benefit from skilled outpatient physical therapy to address the deficits listed in the problem list box on initial evaluation, provide pt/family education and to maximize pt's level of independence in the home and community environment.     Pt's spiritual, cultural and educational needs considered and pt agreeable to plan of care and goals.     Anticipated barriers to physical therapy: None    Goals: Short Term Goals: 2 weeks   1.) Patient will demonstrate independence in compliance and technique of home exercise program provided as per teach-back method of assessment.  2.) Patient will demonstrate a 8-point improvement as per FOTO score to demonstrate improvements in perceived functional ability.  3.) Peggy will note a pain level of no greater than 2/10 after 20 minutes of ambulating her dog.  4.) Peggy will navigate 3 flights of stairs without any increased pain >2/10.        Long Term Goals: 4 weeks   1.) Patient will demonstrate independence in compliance and technique of home exercise program provided as per teach-back method of assessment.  2.) Patient will demonstrate a 8-point improvement as per FOTO score to demonstrate improvements in perceived functional ability.  3.) Peggy will note a pain level of no greater than 2/10 after 20 minutes of ambulating her dog.  4.) Peggy will navigate 3 flights of stairs without any increased pain >2/10.  Plan     Continue to progress as per plan of care.    Ann Valdovinos, PT DPT  Board Certified in Orthopedic Physical Therapy

## 2024-03-26 ENCOUNTER — PATIENT MESSAGE (OUTPATIENT)
Dept: REHABILITATION | Facility: HOSPITAL | Age: 39
End: 2024-03-26
Payer: COMMERCIAL

## 2024-03-31 ENCOUNTER — ON-DEMAND VIRTUAL (OUTPATIENT)
Dept: URGENT CARE | Facility: CLINIC | Age: 39
End: 2024-03-31
Payer: COMMERCIAL

## 2024-03-31 DIAGNOSIS — R39.9 UTI SYMPTOMS: Primary | ICD-10-CM

## 2024-03-31 PROCEDURE — 99213 OFFICE O/P EST LOW 20 MIN: CPT | Mod: 95,,, | Performed by: NURSE PRACTITIONER

## 2024-03-31 RX ORDER — PHENAZOPYRIDINE HYDROCHLORIDE 200 MG/1
200 TABLET, FILM COATED ORAL 3 TIMES DAILY PRN
Qty: 6 TABLET | Refills: 0 | Status: SHIPPED | OUTPATIENT
Start: 2024-03-31 | End: 2024-04-02

## 2024-03-31 RX ORDER — CIPROFLOXACIN 250 MG/1
250 TABLET, FILM COATED ORAL 2 TIMES DAILY
Qty: 6 TABLET | Refills: 0 | Status: SHIPPED | OUTPATIENT
Start: 2024-03-31 | End: 2024-04-03

## 2024-03-31 NOTE — PROGRESS NOTES
Subjective:      Patient ID: Peggy Sloan is a 38 y.o. female.    Vitals:  vitals were not taken for this visit.     Chief Complaint: Urinary Tract Infection      Visit Type: TELE AUDIOVISUAL    Present with the patient at the time of consultation: TELEMED PRESENT WITH PATIENT: None        History reviewed. No pertinent past medical history.  History reviewed. No pertinent surgical history.  Review of patient's allergies indicates:   Allergen Reactions    Penicillins     Sulfa (sulfonamide antibiotics)      Current Outpatient Medications on File Prior to Visit   Medication Sig Dispense Refill    copper intrauterine device (PARAGARD) 380 square mm IUD by Intrauterine route.      triamcinolone acetonide 0.1% (KENALOG) 0.1 % ointment Apply a small amount to affected area nightly as needed for up to 2 weeks.  Do not apply to face or groin.       No current facility-administered medications on file prior to visit.     History reviewed. No pertinent family history.    Medications Ordered                [id number] - [address] [address]    Telephone: [phone]   Fax: [phone]   Hours: Not open 24 hours                         E-Prescribed (2 of 2)              ciprofloxacin HCl (CIPRO) 250 MG tablet    Sig: Take 1 tablet (250 mg total) by mouth 2 (two) times daily. for 3 days       Start: 3/31/24     Quantity: 6 tablet Refills: 0                         phenazopyridine (PYRIDIUM) 200 MG tablet    Sig: Take 1 tablet (200 mg total) by mouth 3 (three) times daily as needed for Pain.       Start: 3/31/24     Quantity: 6 tablet Refills: 0                           Ohs Peq Odvv Intake    3/31/2024  8:55 AM CDT - Filed by Patient   What is your current physical address in the event of a medical emergency? [address] [place] [address]   Are you able to take your vital signs? Yes   Systolic Blood  Pressure:    Diastolic Blood Pressure:    Weight: 150   Height: 66   Pulse: 96   Temperature: 97.5   Respiration rate:    Pulse Oxygen: 99   Please attach any relevant images or files          37 yo female with c/o frequency. She states started 4 days ago with urgency. She denies fever, hematuria. She states positve for dysuria. She states last night she woke 10 times to urinate.         Constitution: Negative. Negative for fever.   HENT: Negative.     Neck: neck negative.   Cardiovascular: Negative.    Respiratory: Negative.     Gastrointestinal: Negative.  Negative for abdominal pain, nausea and vomiting.   Endocrine: negative.   Genitourinary:  Positive for dysuria, frequency and urgency. Negative for vaginal discharge, vaginal odor and genital sore.   Musculoskeletal: Negative.  Negative for back pain.   Skin: Negative.    Neurological: Negative.         Objective:   The physical exam was conducted virtually.  LOCATION OF PATIENT home  Physical Exam   Constitutional: She is oriented to person, place, and time. She appears well-developed.   HENT:   Head: Normocephalic and atraumatic.   Ears:   Right Ear: Hearing, tympanic membrane and external ear normal.   Left Ear: Hearing, tympanic membrane and external ear normal.   Nose: Nose normal.   Mouth/Throat: Uvula is midline, oropharynx is clear and moist and mucous membranes are normal.   Eyes: Conjunctivae and EOM are normal. Pupils are equal, round, and reactive to light.   Neck: Neck supple.   Cardiovascular: Normal rate.   Pulmonary/Chest: Effort normal and breath sounds normal.   Musculoskeletal: Normal range of motion.         General: Normal range of motion.   Neurological: She is alert and oriented to person, place, and time.   Skin: Skin is warm.   Psychiatric: Her behavior is normal. Thought content normal.   Nursing note and vitals reviewed.      Assessment:     1. UTI symptoms        Plan:   PLEASE READ YOUR DISCHARGE INSTRUCTIONS ENTIRELY AS IT CONTAINS  IMPORTANT INFORMATION.      Take the antibiotics to completion.     Drink plenty of fluids, wipe front to back, take showers not baths, no scented soaps, wear breathable cotton underwear, urinate after sexual intercourse.     Please go to the ER for worsening symptoms including fever, worsening flank pain, vomiting, etc.       Please return or see your primary care doctor if you develop new or worsening symptoms.     Please arrange follow up with your primary medical clinic as soon as possible. You must understand that you've received an Urgent Care treatment only and that you may be released before all of your medical problems are known or treated. You, the patient, will arrange for follow up as instructed. If your symptoms worsen or fail to improve you should go to the Emergency Room.  WE CANNOT RULE OUT ALL POSSIBLE CAUSES OF YOUR SYMPTOMS IN THE URGENT CARE SETTING PLEASE GO TO THE ER IF YOU FEELS YOUR CONDITION IS WORSENING OR YOU WOULD LIKE EMERGENT EVALUATION.      UTI symptoms  -     ciprofloxacin HCl (CIPRO) 250 MG tablet; Take 1 tablet (250 mg total) by mouth 2 (two) times daily. for 3 days  Dispense: 6 tablet; Refill: 0  -     phenazopyridine (PYRIDIUM) 200 MG tablet; Take 1 tablet (200 mg total) by mouth 3 (three) times daily as needed for Pain.  Dispense: 6 tablet; Refill: 0

## 2024-04-03 ENCOUNTER — CLINICAL SUPPORT (OUTPATIENT)
Dept: REHABILITATION | Facility: HOSPITAL | Age: 39
End: 2024-04-03
Payer: COMMERCIAL

## 2024-04-03 DIAGNOSIS — M25.60 DECREASED MOBILITY OF JOINT: Primary | ICD-10-CM

## 2024-04-03 PROCEDURE — 97110 THERAPEUTIC EXERCISES: CPT

## 2024-04-03 PROCEDURE — 97112 NEUROMUSCULAR REEDUCATION: CPT

## 2024-04-03 PROCEDURE — 97140 MANUAL THERAPY 1/> REGIONS: CPT

## 2024-04-08 NOTE — PLAN OF CARE
"   Physical Therapy Daily Treatment Note     Name: Peggy Sloan  Clinic Number: [id number]  Therapy Diagnosis:   Encounter Diagnosis   Name Primary?    Decreased mobility of joint Yes     Physician: Dawson Baig II(Lisbet*  Visit Date: 4/3/2024  Physician: Dawson Baig*  Physician Orders: PT Eval and Treat   Medical Diagnosis from Referral: M76.62 (ICD-10-CM) - Tendonitis, Achilles, left   Evaluation Date: 2/20/2024  Authorization Period Expiration: 1/28/2025  Plan of Care Expiration: 4/19/2024  Progress Note Due: 4/17/2024  Most Recent Progress Note: 3/18/2024  Date of Surgery: None  Visit # / Visits authorized: 5/ 20   FOTO: 1/3:  Precautions: Standard   Time In: 9:00 am  Time Out: 9:55 am  Total Billable Time: 55 minutes  Precautions: Standard  Subjective   Pt reports: she was very sore after our last session, but after that it felt better, and she was even able to raise her heel off the ground without pain, but then noticed that she stepped wrong and the pain returned.  She was compliant with home exercise program. (90% of the time)  Response to previous treatment: No adverse effect  Functional change: Ongoing  Pain: 1/10  Location: Left ankles   Objective   Gait: Peggy Moe ambulates with decreased subtalar joint range of motion into eversion, with decreased bilateral talocrural dorsiflexion.    Peggy Moe received therapeutic exercises to develop strength, endurance, and ROM for 8 minutes including:  Half-Kneeling Dorsiflexion Self-Mobilization, 15x5" hold  Objective measures taken (see above)  Self-Subtalar Joint Mobilizations      Peggy Moe received the following manual therapy techniques: Joint mobilizations and Manual traction were applied for 26 minutes, including:  (L) Talocrural Joint Distraction Mobilization, Grade V  (L) Subtalar Eversion Sidelying Manipulation  Great Toe Mobilization, Grade IV  Grade V Medial Cuneiform Manipulation  Grade V Lumbar Gapping Mobilization to " "L5-S1  Objective measures taken (see above)    Peggy Moe participated in neuromuscular re-education activities to improve: Balance, Coordination, Kinesthetic, Sense, Proprioception, and Posture for 21 minutes. The following activities were included:  Clamshells, yellow band, 2x12, bilateral, 5" hold  Bridge with Marching 2x12    Peggy Moe participated in dynamic functional therapeutic activities to improve functional performance for 00  minutes, includin    Home Exercises Provided and Patient Education Provided     Education provided:   - Home exercise program     Written Home Exercises Provided: Patient instructed to cont prior HEP.  Exercises were reviewed and Peggy Moe was able to demonstrate them prior to the end of the session.  Peggy Moe demonstrated good  understanding of the education provided.     See EMR under Patient Instructions for exercises provided prior visit.    Assessment   Peggy Moe continues to demonstrate decreased talocrural and subtalar joint mobility, greater on the right than on the left side. Straight-leg raise test with peroneal and sural nerve bias did not yield positive results for concordant symptoms. Increased proximal stability training as well as addressing hypomobility of the lumbar spine L5-S1 segments improved pain to nearly 0/10 after completion. M.E. will benefit from a customized physical therapy plan of care  to address the aforementioned impairments in an effort to improve human function and quality of life, but was encouraged to also follow up with referring physician due to a plateau in progress and worsening of anterior symptoms.        Peggy Moe Is progressing well towards her goals.   Pt prognosis is Good.     Pt will continue to benefit from skilled outpatient physical therapy to address the deficits listed in the problem list box on initial evaluation, provide pt/family education and to maximize pt's level of independence in the home " and community environment.     Pt's spiritual, cultural and educational needs considered and pt agreeable to plan of care and goals.     Anticipated barriers to physical therapy: None    Goals: Short Term Goals: 2 weeks   1.) Patient will demonstrate independence in compliance and technique of home exercise program provided as per teach-back method of assessment. Ongoing  2.) Patient will demonstrate a 8-point improvement as per FOTO score to demonstrate improvements in perceived functional ability. Ongoing  3.) Peggy will note a pain level of no greater than 2/10 after 20 minutes of ambulating her dog. Ongoing  4.) Peggy will navigate 3 flights of stairs without any increased pain >2/10. Ongoing        Long Term Goals: 4 weeks   1.) Patient will demonstrate independence in compliance and technique of home exercise program provided as per teach-back method of assessment. Ongoing  2.) Patient will demonstrate a 8-point improvement as per FOTO score to demonstrate improvements in perceived functional ability. Ongoing  3.) Peggy will note a pain level of no greater than 2/10 after 20 minutes of ambulating her dog. Ongoing  4.) Peggy will navigate 3 flights of stairs without any increased pain >2/10. Ongoing  Plan     Continue to progress as per plan of care.    Ann Valdovinos, PT DPT  Board Certified in Orthopedic Physical Therapy

## 2024-04-22 ENCOUNTER — PATIENT MESSAGE (OUTPATIENT)
Dept: REHABILITATION | Facility: HOSPITAL | Age: 39
End: 2024-04-22
Payer: COMMERCIAL

## 2024-04-23 DIAGNOSIS — M76.62 ACHILLES TENDINITIS, LEFT LEG: Primary | ICD-10-CM

## 2024-04-26 ENCOUNTER — PATIENT MESSAGE (OUTPATIENT)
Dept: REHABILITATION | Facility: HOSPITAL | Age: 39
End: 2024-04-26
Payer: COMMERCIAL

## 2024-04-29 ENCOUNTER — CLINICAL SUPPORT (OUTPATIENT)
Dept: REHABILITATION | Facility: HOSPITAL | Age: 39
End: 2024-04-29
Payer: COMMERCIAL

## 2024-04-29 DIAGNOSIS — M25.60 DECREASED MOBILITY OF JOINT: Primary | ICD-10-CM

## 2024-04-29 DIAGNOSIS — M76.62 ACHILLES TENDINITIS, LEFT LEG: ICD-10-CM

## 2024-04-29 PROCEDURE — 97110 THERAPEUTIC EXERCISES: CPT

## 2024-04-29 PROCEDURE — 97140 MANUAL THERAPY 1/> REGIONS: CPT

## 2024-04-29 NOTE — PROGRESS NOTES
Physical Therapy Daily Treatment and Progress Note     Name: Peggy Sloan  Clinic Number: [id number]  Therapy Diagnosis:   Encounter Diagnoses   Name Primary?    Achilles tendinitis, left leg     Decreased mobility of joint Yes     Physician: Dawson Baig*  Visit Date: 4/29/2024  Physician: Dawson Baig*  Physician Orders: PT Eval and Treat   Medical Diagnosis from Referral: M76.62 (ICD-10-CM) - Tendonitis, Achilles, left   Evaluation Date: 2/20/2024  Authorization Period Expiration: 1/28/2025  Plan of Care Expiration: 4/19/2024 *Update to   Progress Note Due: 4/17/2024  Most Recent Progress Note: 3/18/2024  Date of Surgery: None  Visit # / Visits authorized: 6/ 20   FOTO: 1/3:  Precautions: Standard   Time In: 2:05 pm  Time Out: 3:00 pm  Total Billable Time: 55 minutes  Precautions: Standard  Subjective   Pt reports: her ankle has been 70% better, and she hasn't really had much pain since our last evaluation.  She was compliant with home exercise program. (90% of the time)  Response to previous treatment: No adverse effect  Functional change: Ongoing  Pain: 1/10  Location: Left ankles   Objective   Ankle Evaluation Template  Ankle Active Range of Motion   Left   Dorsiflexion (20) 4 degrees   Plantarflexion (50) 50 degrees   Inversion (20-30) 40 degrees   Eversion (5-10) 5 degrees   Great Toe Extension (30) 25 degrees   *Closed-Chain Dorsiflexion: 4.4 cm difference  Joint Mobility   Right Left   Talocrural Anterior Glide Slight Hypomobility Moderate Hypomobility   Distal Tibiofibular Joint Slight Hypomobility Slight Hypomobility   Subtalar Joint Mobility Slight Hypomobility Moderate Hypomobility   Talonavicular Joint Moderate Hypomobility Moderate Hypomobility   Cuboid/MT 4-5 (inversion) Normal Hypomobility     Manual Muscle Testing   Right Left   Dorsiflexion (+Inversion) 5/5 5/5   Plantar Flexion 5/5 5/5 *Increased pain during mid-range of motion   Inversion 5/5 5/5   Eversion 5/5 5/5  "  Flexor Hallucis Brevis 4/5 4-/5   Flexor Hallucis Longus 3/5 3/5     Peggy Moe received therapeutic exercises to develop strength, endurance, and ROM for 34 minutes including:  Half-Kneeling Dorsiflexion Self-Mobilization, 15x5" hold  Objective measures taken (see above)  Self-Subtalar Joint Mobilizations  Bilateral Heel Raises (on and off step), 2x12      Peggy Moe received the following manual therapy techniques: Joint mobilizations and Manual traction were applied for 21 minutes, including:  (L) Talocrural Joint Distraction Mobilization, Grade V  (L) Subtalar Eversion Sidelying Manipulation  Great Toe Mobilization, Grade IV  Grade V Medial Cuneiform Manipulation  Grade V Lumbar Gapping Mobilization to L5-S1  Objective measures taken (see above)    Peggy Moe participated in neuromuscular re-education activities to improve: Balance, Coordination, Kinesthetic, Sense, Proprioception, and Posture for 00 minutes. The following activities were included:  Clamshells, yellow band, 2x12, bilateral, 5" hold  Bridge with Marching 2x12    Peggy Moe participated in dynamic functional therapeutic activities to improve functional performance for 00  minutes, includin    Home Exercises Provided and Patient Education Provided     Education provided:   - Home exercise program     Written Home Exercises Provided: Patient instructed to cont prior HEP.  Exercises were reviewed and Peggy Moe was able to demonstrate them prior to the end of the session.  Peggy Moe demonstrated good  understanding of the education provided.     See EMR under Patient Instructions for exercises provided prior visit.    Assessment   Peggy Moe returns to physical therapy after hiatus with a referral back to Dr. Baig. Her pain has markedly improved since her last visit and has not be compromising to quality of life. She demonstrates continued decreased left talocrural mobility, at a difference of 4.4 cm as compared " to the right side. Subtalar joint more limited on the right than on the left with also continued decreased proximal gluteal muscle performance. [email or web address] will continue to benefit from a customized physical therapy plan of care  to address the aforementioned impairments in an effort to improve human function and quality of life.          Peggy Moe Is progressing well towards her goals.   Pt prognosis is Good.     Pt will continue to benefit from skilled outpatient physical therapy to address the deficits listed in the problem list box on initial evaluation, provide pt/family education and to maximize pt's level of independence in the home and community environment.     Pt's spiritual, cultural and educational needs considered and pt agreeable to plan of care and goals.     Anticipated barriers to physical therapy: None    Goals: Short Term Goals: 2 weeks   1.) Patient will demonstrate independence in compliance and technique of home exercise program provided as per teach-back method of assessment. Ongoing  2.) Patient will demonstrate a 8-point improvement as per FOTO score to demonstrate improvements in perceived functional ability. Ongoing  3.) Peggy will note a pain level of no greater than 2/10 after 20 minutes of ambulating her dog. Met  4.) Peggy will navigate 3 flights of stairs without any increased pain >2/10. Met        Long Term Goals: 4 weeks   1.) Patient will demonstrate independence in compliance and technique of home exercise program provided as per teach-back method of assessment. Ongoing  2.) Patient will demonstrate a 8-point improvement as per FOTO score to demonstrate improvements in perceived functional ability. Ongoing  3.) Peggy will note a pain level of no greater than 2/10 after 20 minutes of ambulating her dog. Met  4.) Peggy will navigate 3 flights of stairs without any increased pain >2/10. Met  Plan     Continue to progress as per plan of care.    Ann Valdovinos, PT DPT  Board Certified in  Orthopedic Physical Therapy

## 2024-05-06 ENCOUNTER — CLINICAL SUPPORT (OUTPATIENT)
Dept: REHABILITATION | Facility: HOSPITAL | Age: 39
End: 2024-05-06
Payer: COMMERCIAL

## 2024-05-06 DIAGNOSIS — M76.62 TENDONITIS, ACHILLES, LEFT: ICD-10-CM

## 2024-05-06 DIAGNOSIS — M76.62 ACHILLES TENDINITIS, LEFT LEG: Primary | ICD-10-CM

## 2024-05-06 DIAGNOSIS — M25.60 DECREASED MOBILITY OF JOINT: ICD-10-CM

## 2024-05-06 PROCEDURE — 97140 MANUAL THERAPY 1/> REGIONS: CPT

## 2024-05-06 PROCEDURE — 97112 NEUROMUSCULAR REEDUCATION: CPT

## 2024-05-06 PROCEDURE — 97110 THERAPEUTIC EXERCISES: CPT

## 2024-05-06 NOTE — PROGRESS NOTES
"   Physical Therapy Daily Treatment  Note     Name: Peggy Sloan  Clinic Number: [id number]  Therapy Diagnosis:   Encounter Diagnoses   Name Primary?    Achilles tendinitis, left leg Yes    Decreased mobility of joint     Tendonitis, Achilles, left        Physician: Dawson Baig II(Lisbet*  Visit Date: 5/6/2024  Physician: Dawson Baig II(Lisbet*  Physician Orders: PT Eval and Treat   Medical Diagnosis from Referral: M76.62 (ICD-10-CM) - Tendonitis, Achilles, left   Evaluation Date: 2/20/2024  Authorization Period Expiration: 1/28/2025  Plan of Care Expiration: 4/19/2024 *Update to   Progress Note Due: 4/17/2024  Most Recent Progress Note: 3/18/2024  Date of Surgery: None  Visit # / Visits authorized: 6/ 20   FOTO: 1/3:  Precautions: Standard   Time In: 4:05 pm  Time Out: 5:00 pm  Total Billable Time: 55 minutes  Precautions: Standard  Subjective   Pt reports: she has only been a "little sore," like a 1.5/10 while walking the dog today in her tennis shoes.  She was compliant with home exercise program. (90% of the time)  Response to previous treatment: No adverse effect  Functional change: Ongoing  Pain: 1/10  Location: Left ankles   Objective     Peggy Moe received therapeutic exercises to develop strength, endurance, and ROM for 34 minutes including:  Standing Closed-Chain Dorsiflexion Self-Mobilization, 15x5" hold  Bilateral Heel Raises (80% of full range of motion) 2x12  Shuttle Posterior Gluteus Medius, 37.5#, 2x12    Peggy Moe received the following manual therapy techniques: Joint mobilizations and Manual traction were applied for 21 minutes, including:  (L) Talocrural Joint Distraction Mobilization, Grade V  Cuboid Manipulation (L)  Objective measures taken (see above)    Peggy Moe participated in neuromuscular re-education activities to improve: Balance, Coordination, Kinesthetic, Sense, Proprioception, and Posture for 00 minutes. The following activities were included:  Clamshells, red " "band, 2x12, bilateral, 5" hold  Single Leg Bridge 2x12, blue band  Single Leg Stance on Airex Pad, 3x30"    Peggy Moe participated in dynamic functional therapeutic activities to improve functional performance for 00  minutes, includin    Home Exercises Provided and Patient Education Provided     Education provided:   - Home exercise program     Written Home Exercises Provided: Patient instructed to cont prior HEP.  Exercises were reviewed and Peggy Moe was able to demonstrate them prior to the end of the session.  Peggy Moe demonstrated good  understanding of the education provided.     See EMR under Patient Instructions for exercises provided prior visit.    Assessment   Peggy Moe continues to demonstrates slightly asymmetrical dorsiflexion range of motion on the left as compared to the right, with improvement from last session. Facilitation of proximal chain musculature was also facilitated this visit with good tolerance, and no pain with heel raises without going to end-range of motion.    Peggy Moe Is progressing well towards her goals.   Pt prognosis is Good.     Pt will continue to benefit from skilled outpatient physical therapy to address the deficits listed in the problem list box on initial evaluation, provide pt/family education and to maximize pt's level of independence in the home and community environment.     Pt's spiritual, cultural and educational needs considered and pt agreeable to plan of care and goals.     Anticipated barriers to physical therapy: None    Goals: Short Term Goals: 2 weeks   1.) Patient will demonstrate independence in compliance and technique of home exercise program provided as per teach-back method of assessment. Ongoing  2.) Patient will demonstrate a 8-point improvement as per FOTO score to demonstrate improvements in perceived functional ability. Ongoing  3.) Peggy will note a pain level of no greater than 2/10 after 20 minutes of ambulating " her dog. Met  4.) Peggy will navigate 3 flights of stairs without any increased pain >2/10. Met        Long Term Goals: 4 weeks   1.) Patient will demonstrate independence in compliance and technique of home exercise program provided as per teach-back method of assessment. Ongoing  2.) Patient will demonstrate a 8-point improvement as per FOTO score to demonstrate improvements in perceived functional ability. Ongoing  3.) Peggy will note a pain level of no greater than 2/10 after 20 minutes of ambulating her dog. Met  4.) Peggy will navigate 3 flights of stairs without any increased pain >2/10. Met  Plan     Continue to progress as per plan of care.    Ann Valdovinos, PT DPT  Board Certified in Orthopedic Physical Therapy

## 2024-05-21 ENCOUNTER — PATIENT MESSAGE (OUTPATIENT)
Dept: REHABILITATION | Facility: HOSPITAL | Age: 39
End: 2024-05-21
Payer: COMMERCIAL

## 2024-05-30 ENCOUNTER — CLINICAL SUPPORT (OUTPATIENT)
Dept: REHABILITATION | Facility: HOSPITAL | Age: 39
End: 2024-05-30
Payer: COMMERCIAL

## 2024-05-30 DIAGNOSIS — M76.62 ACHILLES TENDINITIS, LEFT LEG: Primary | ICD-10-CM

## 2024-05-30 DIAGNOSIS — M25.60 DECREASED MOBILITY OF JOINT: ICD-10-CM

## 2024-05-30 DIAGNOSIS — M76.62 TENDONITIS, ACHILLES, LEFT: ICD-10-CM

## 2024-05-30 PROCEDURE — 97112 NEUROMUSCULAR REEDUCATION: CPT

## 2024-05-30 PROCEDURE — 97110 THERAPEUTIC EXERCISES: CPT

## 2024-05-30 PROCEDURE — 97140 MANUAL THERAPY 1/> REGIONS: CPT

## 2024-05-30 NOTE — PROGRESS NOTES
Physical Therapy Daily Treatment and Progress Note     Name: Peggy Sloan  Clinic Number: [id number]  Therapy Diagnosis:   Encounter Diagnoses   Name Primary?    Achilles tendinitis, left leg Yes    Decreased mobility of joint     Tendonitis, Achilles, left        Physician: Dawson Baig II(Lisbet*  Visit Date: 5/30/2024  Physician: Dawson Baig*  Physician Orders: PT Eval and Treat   Medical Diagnosis from Referral: M76.62 (ICD-10-CM) - Tendonitis, Achilles, left   Evaluation Date: 2/20/2024  Authorization Period Expiration: 1/28/2025  Plan of Care Expiration: 4/19/2024 *Update to 6/30/2024  Progress Note Due: 6/30/2024  Most Recent Progress Note: 5/30/2024  Date of Surgery: None  Visit # / Visits authorized: 7/ 20   FOTO: 1/3:  Precautions: Standard   Time In: 10:00 am  Time Out: 10:55 am  Total Billable Time: 55 minutes  Precautions: Standard  Subjective   Pt reports: less sore days, but yesterday had a bit more soreness that she cannot attribute the pain to.  She was compliant with home exercise program. (90% of the time)  Response to previous treatment: No adverse effect  Functional change: Ongoing  Pain: 1/10  Location: Left ankles   Objective   Ankle Evaluation Template  Ankle Active Range of Motion   Left   Dorsiflexion (20) 4 degrees   Plantarflexion (50) 50 degrees   Inversion (20-30) 40 degrees   Eversion (5-10) 5 degrees   Great Toe Extension (30) 25 degrees   *Closed-Chain Dorsiflexion: 4.4 cm difference  Joint Mobility   Right Left   Talocrural Anterior Glide Slight Hypomobility Moderate Hypomobility   Distal Tibiofibular Joint Slight Hypomobility Slight Hypomobility   Subtalar Joint Mobility Slight Hypomobility Moderate Hypomobility   Talonavicular Joint Moderate Hypomobility Moderate Hypomobility   Cuboid/MT 4-5 (inversion) Normal Hypomobility     Manual Muscle Testing   Right Left   Dorsiflexion (+Inversion) 5/5 5/5   Plantar Flexion 5/5 5/5 *Increased pain during mid-range of motion  "  Inversion 5/5 5/5   Eversion 5/5 5/5   Flexor Hallucis Brevis 4/5 4-/5   Flexor Hallucis Longus 3/5 3/5     Peroneal Nerve Tension Test: (+)    Peggy Moe received therapeutic exercises to develop strength, endurance, and ROM for 8 minutes including:  Standing Dorsiflexion Self-Mobilization, 15x5" hold    Peggy Moe received the following manual therapy techniques: Joint mobilizations and Manual traction were applied for 16 minutes, including:  (L) Talocrural Joint Distraction Mobilization, Grade V  Objective measures taken (see above    Peggy Moe participated in neuromuscular re-education activities to improve: Balance, Coordination, Kinesthetic, Sense, Proprioception, and Posture for 36 minutes. The following activities were included:  Clamshells, yellow band, 2x12, bilateral, 5" hold  Bridge with Marching 2x12  Arch Doming with Single Leg Stance, 10x10", left  Hip Abduction Isometric with Ball, 3x5" hold each side    Peggy Moe participated in dynamic functional therapeutic activities to improve functional performance for 00  minutes, includin    Home Exercises Provided and Patient Education Provided     Education provided:   - Home exercise program     Written Home Exercises Provided: Patient instructed to cont prior HEP.  Exercises were reviewed and Peggy Moe was able to demonstrate them prior to the end of the session.  Peggy Moe demonstrated good  understanding of the education provided.     See EMR under Patient Instructions for exercises provided prior visit.    Assessment   Peggy Moe continues to demonstrate greater left talocrural dorsiflexion range of motion and accessory mobility compared to the right, along with peroneal nerve pain that is concordant for her symptoms just anterior to the lateral malleolus. She also notes more pain with bilateral plantarflexion against gravity than single leg. Due to continued impairments and limitations, Peggy Moe will " benefit from a customized physical therapy plan of care  to address the aforementioned impairments in an effort to improve human function and quality of life.    Peggy Moe Is progressing well towards her goals.   Pt prognosis is Good.     Pt will continue to benefit from skilled outpatient physical therapy to address the deficits listed in the problem list box on initial evaluation, provide pt/family education and to maximize pt's level of independence in the home and community environment.     Pt's spiritual, cultural and educational needs considered and pt agreeable to plan of care and goals.     Anticipated barriers to physical therapy: None    Goals: Short Term Goals: 2 weeks   1.) Patient will demonstrate independence in compliance and technique of home exercise program provided as per teach-back method of assessment. Ongoing  2.) Patient will demonstrate a 8-point improvement as per FOTO score to demonstrate improvements in perceived functional ability. Ongoing  3.) Peggy will note a pain level of no greater than 2/10 after 20 minutes of ambulating her dog. Met  4.) Peggy will navigate 3 flights of stairs without any increased pain >2/10. Met        Long Term Goals: 4 weeks   1.) Patient will demonstrate independence in compliance and technique of home exercise program provided as per teach-back method of assessment. Ongoing  2.) Patient will demonstrate a 8-point improvement as per FOTO score to demonstrate improvements in perceived functional ability. Ongoing  3.) Peggy will note a pain level of no greater than 2/10 after 20 minutes of ambulating her dog. Met  4.) Peggy will navigate 3 flights of stairs without any increased pain >2/10. Met  Plan     Continue to progress as per plan of care.    Ann Valdovinos, PT DPT  Board Certified in Orthopedic Physical Therapy

## 2024-06-20 ENCOUNTER — CLINICAL SUPPORT (OUTPATIENT)
Dept: REHABILITATION | Facility: HOSPITAL | Age: 39
End: 2024-06-20
Payer: COMMERCIAL

## 2024-06-20 DIAGNOSIS — M76.62 ACHILLES TENDINITIS, LEFT LEG: Primary | ICD-10-CM

## 2024-06-20 DIAGNOSIS — M76.62 TENDONITIS, ACHILLES, LEFT: ICD-10-CM

## 2024-06-20 DIAGNOSIS — M25.60 DECREASED MOBILITY OF JOINT: ICD-10-CM

## 2024-06-20 PROCEDURE — 97110 THERAPEUTIC EXERCISES: CPT

## 2024-06-20 PROCEDURE — 97112 NEUROMUSCULAR REEDUCATION: CPT

## 2024-06-20 PROCEDURE — 97140 MANUAL THERAPY 1/> REGIONS: CPT

## 2024-06-20 NOTE — PLAN OF CARE
Physical Therapy Daily Treatment and Progress Note     Name: Peggy Sloan  Clinic Number: [id number]  Therapy Diagnosis:   Encounter Diagnoses   Name Primary?    Achilles tendinitis, left leg Yes    Decreased mobility of joint     Tendonitis, Achilles, left        Physician: Dawson Baig II(Lisbet*  Visit Date: 6/20/2024  Physician: Dawson Baig II(Lisbet*  Physician Orders: PT Eval and Treat   Medical Diagnosis from Referral: M76.62 (ICD-10-CM) - Tendonitis, Achilles, left   Evaluation Date: 2/20/2024  Authorization Period Expiration: 1/28/2025  Plan of Care Expiration: 4/19/2024 *Update to 6/30/2024  Progress Note Due: 6/30/2024  Most Recent Progress Note: 5/30/2024  Date of Surgery: None  Visit # / Visits authorized: 8/ 20   FOTO: 3/3: 95% improvement  Precautions: Standard   Time In: 10:00 am  Time Out: 10:55 am  Total Billable Time: 55 minutes  Precautions: Standard  Subjective   Pt reports: she went to the beach and had some pain while walking in the sand, most severe at a 2/10 pain.  She was compliant with home exercise program. (90% of the time)  Response to previous treatment: No adverse effect  Functional change: Ongoing  Pain: 1/10  Location: Left ankles   Objective   Double Leg Squat: No concordant pain for full 30 second duration  Single Leg Stance: Increased ankle strategy and instability with Trendelenberg hip drop; greater on the left than on the right; No concordant pain for full 30 second duration  Double Leg Jumping: No concordant pain for full 30 second duration  Single Leg Hopping: Decreased balance and stability with decreased landing on toes for shock absorbtion, no concordant pain for full 30 second duration  Cuboid Mobility: Hypomobile on the left side as compared to the right side  Talocrural Joint Mobility: Hypomobile on the left side as compared to the right side  Peroneal Nerve Tension Test: (-); no tenderness to peroneal nerve    Peggy Moe received therapeutic exercises to  "develop strength, endurance, and ROM for 22 minutes including:  Half Kneeling Dorsiflexion Self-Mobilization, 15x5" hold  Objective measures taken (see above    Peggy Moe received the following manual therapy techniques: Joint mobilizations and Manual traction were applied for 8 minutes, including:  (L) Talocrural Joint Distraction Mobilization, Grade V  (L) Grade IV Anterior to Posterior Talocrural Joint Mobilizations   Cuboid Manipulation, Grade V, 2x      Peggy Moe participated in neuromuscular re-education activities to improve: Balance, Coordination, Kinesthetic, Sense, Proprioception, and Posture for 25 minutes. The following activities were included:  Clamshells with Lateral Plank, red band, 2x10, bilateral, 5" hold  Arch Doming with Single Leg Stance, 20x5", left  Arch Doming with Single Leg Squat, 15x5", left, 2 sets  Arch Doming with Double Leg Heel Raise, 3x30" duration  Arch Doming with Double Leg Heel Raise, 3x30", duration    Peggy Moe participated in dynamic functional therapeutic activities to improve functional performance for 00  minutes, includin    Home Exercises Provided and Patient Education Provided     Education provided:   - Home exercise program     Written Home Exercises Provided: Patient instructed to cont prior HEP.  Exercises were reviewed and Peggy Moe was able to demonstrate them prior to the end of the session.  Peggy Moe demonstrated good  understanding of the education provided.     See EMR under Patient Instructions for exercises provided prior visit.    Assessment   Peggy Moe has undergone 8 visits in the care of her left lateral ankle and foot pain, near the area of the cuboid and peroneal nerve regions. Although no reproduction of concordant symptoms were found with higher intensity testing, including double and single leg jumping, greater challenge with shock-absorption and soft landing was noted as well as impaired muscle performance of " lateral hip abduction and external rotation. Cuboid and talocrural hypomobility remains more prominent on the left side than on the right side, and upon addressing this with manual therapy, she was able to perform single leg jumping with greater ease and forefoot landing. She was provided with comprehensive independent management with foot intrinsic strengthening, and proximal hip strengthening in isolated and functional patterns, and balance training. ME was encouraged to reach out for any further therapy needs. 95% improvement as per FOTO.    Peggy Moe Is progressing well towards her goals.   Pt prognosis is Good.     Pt will continue to benefit from skilled outpatient physical therapy to address the deficits listed in the problem list box on initial evaluation, provide pt/family education and to maximize pt's level of independence in the home and community environment.     Pt's spiritual, cultural and educational needs considered and pt agreeable to plan of care and goals.     Anticipated barriers to physical therapy: None    Goals: Short Term Goals: 2 weeks   1.) Patient will demonstrate independence in compliance and technique of home exercise program provided as per teach-back method of assessment. Ongoing  2.) Patient will demonstrate a 8-point improvement as per FOTO score to demonstrate improvements in perceived functional ability. Ongoing  3.) Peggy will note a pain level of no greater than 2/10 after 20 minutes of ambulating her dog. Met  4.) Peggy will navigate 3 flights of stairs without any increased pain >2/10. Met     Long Term Goals: 4 weeks   1.) Patient will demonstrate independence in compliance and technique of home exercise program provided as per teach-back method of assessment. Met  2.) Patient will demonstrate a 8-point improvement as per FOTO score to demonstrate improvements in perceived functional ability. Met  3.) Peggy will note a pain level of no greater than 2/10 after 20 minutes of  ambulating her dog. Met  4.) Peggy will navigate 3 flights of stairs without any increased pain >2/10. Met  Plan   Discharge with independent self-management.    Ann Valdovinos, PT , DPT  Board Certified in Orthopedic Physical Therapy    Co-treated with Mak Fitzpatrick PT, DPT  Board Certified in Orthopedic Physical Therapy  Board Certified in Sports Physical Therapy  Fellow, [employer] Physical The[employer]

## 2024-06-20 NOTE — PROGRESS NOTES
Physical Therapy Daily Treatment and Progress Note     Name: Peggy Sloan  Clinic Number: [id number]  Therapy Diagnosis:   Encounter Diagnoses   Name Primary?    Achilles tendinitis, left leg Yes    Decreased mobility of joint     Tendonitis, Achilles, left        Physician: Dawson Baig II(Lisbet*  Visit Date: 6/20/2024  Physician: Dawson Baig II(Lisbet*  Physician Orders: PT Eval and Treat   Medical Diagnosis from Referral: M76.62 (ICD-10-CM) - Tendonitis, Achilles, left   Evaluation Date: 2/20/2024  Authorization Period Expiration: 1/28/2025  Plan of Care Expiration: 4/19/2024 *Update to 6/30/2024  Progress Note Due: 6/30/2024  Most Recent Progress Note: 5/30/2024  Date of Surgery: None  Visit # / Visits authorized: 8/ 20   FOTO: 3/3: 95% improvement  Precautions: Standard   Time In: 10:00 am  Time Out: 10:55 am  Total Billable Time: 55 minutes  Precautions: Standard  Subjective   Pt reports: she went to the beach and had some pain while walking in the sand, most severe at a 2/10 pain.  She was compliant with home exercise program. (90% of the time)  Response to previous treatment: No adverse effect  Functional change: Ongoing  Pain: 1/10  Location: Left ankles   Objective   Double Leg Squat: No concordant pain for full 30 second duration  Single Leg Stance: Increased ankle strategy and instability with Trendelenberg hip drop; greater on the left than on the right; No concordant pain for full 30 second duration  Double Leg Jumping: No concordant pain for full 30 second duration  Single Leg Hopping: Decreased balance and stability with decreased landing on toes for shock absorbtion, no concordant pain for full 30 second duration  Cuboid Mobility: Hypomobile on the left side as compared to the right side  Talocrural Joint Mobility: Hypomobile on the left side as compared to the right side  Peroneal Nerve Tension Test: (-); no tenderness to peroneal nerve    Peggy Moe received therapeutic exercises to  "develop strength, endurance, and ROM for 22 minutes including:  Half Kneeling Dorsiflexion Self-Mobilization, 15x5" hold  Objective measures taken (see above    Peggy Moe received the following manual therapy techniques: Joint mobilizations and Manual traction were applied for 8 minutes, including:  (L) Talocrural Joint Distraction Mobilization, Grade V  (L) Grade IV Anterior to Posterior Talocrural Joint Mobilizations   Cuboid Manipulation, Grade V, 2x      Peggy Moe participated in neuromuscular re-education activities to improve: Balance, Coordination, Kinesthetic, Sense, Proprioception, and Posture for 25 minutes. The following activities were included:  Clamshells with Lateral Plank, red band, 2x10, bilateral, 5" hold  Arch Doming with Single Leg Stance, 20x5", left  Arch Doming with Single Leg Squat, 15x5", left, 2 sets  Arch Doming with Double Leg Heel Raise, 3x30" duration  Arch Doming with Double Leg Heel Raise, 3x30", duration    Peggy Moe participated in dynamic functional therapeutic activities to improve functional performance for 00  minutes, includin    Home Exercises Provided and Patient Education Provided     Education provided:   - Home exercise program     Written Home Exercises Provided: Patient instructed to cont prior HEP.  Exercises were reviewed and Peggy Moe was able to demonstrate them prior to the end of the session.  Peggy Moe demonstrated good  understanding of the education provided.     See EMR under Patient Instructions for exercises provided prior visit.    Assessment   Peggy Moe has undergone 8 visits in the care of her left lateral ankle and foot pain, near the area of the cuboid and peroneal nerve regions. Although no reproduction of concordant symptoms were found with higher intensity testing, including double and single leg jumping, greater challenge with shock-absorption and soft landing was noted as well as impaired muscle performance of " lateral hip abduction and external rotation. Cuboid and talocrural hypomobility remains more prominent on the left side than on the right side, and upon addressing this with manual therapy, she was able to perform single leg jumping with greater ease and forefoot landing. She was provided with comprehensive independent management with foot intrinsic strengthening, and proximal hip strengthening in isolated and functional patterns, and balance training. ME was encouraged to reach out for any further therapy needs. 95% improvement as per FOTO.    Peggy Moe Is progressing well towards her goals.   Pt prognosis is Good.     Pt will continue to benefit from skilled outpatient physical therapy to address the deficits listed in the problem list box on initial evaluation, provide pt/family education and to maximize pt's level of independence in the home and community environment.     Pt's spiritual, cultural and educational needs considered and pt agreeable to plan of care and goals.     Anticipated barriers to physical therapy: None    Goals: Short Term Goals: 2 weeks   1.) Patient will demonstrate independence in compliance and technique of home exercise program provided as per teach-back method of assessment. Ongoing  2.) Patient will demonstrate a 8-point improvement as per FOTO score to demonstrate improvements in perceived functional ability. Ongoing  3.) Peggy will note a pain level of no greater than 2/10 after 20 minutes of ambulating her dog. Met  4.) Peggy will navigate 3 flights of stairs without any increased pain >2/10. Met     Long Term Goals: 4 weeks   1.) Patient will demonstrate independence in compliance and technique of home exercise program provided as per teach-back method of assessment. Met  2.) Patient will demonstrate a 8-point improvement as per FOTO score to demonstrate improvements in perceived functional ability. Met  3.) Peggy will note a pain level of no greater than 2/10 after 20 minutes of  ambulating her dog. Met  4.) Peggy will navigate 3 flights of stairs without any increased pain >2/10. Met  Plan   Discharge with independent self-management.    Ann Valdovinos, PT , DPT  Board Certified in Orthopedic Physical Therapy    Co-treated with Mak Fitzpatrick PT, DPT  Board Certified in Orthopedic Physical Therapy  Board Certified in Sports Physical Therapy  Fellow, [employer] Physical Therapists

## 2024-12-27 NOTE — PROGRESS NOTES
"   Physical Therapy Daily Treatment Note     Name: Peggy Sloan  Clinic Number: [id number]  Therapy Diagnosis:   Encounter Diagnosis   Name Primary?    Decreased mobility of joint Yes     Physician: Dawson Baig II(Lisbet*  Visit Date: 4/3/2024  Physician: Dawson Baig*  Physician Orders: PT Eval and Treat   Medical Diagnosis from Referral: M76.62 (ICD-10-CM) - Tendonitis, Achilles, left   Evaluation Date: 2/20/2024  Authorization Period Expiration: 1/28/2025  Plan of Care Expiration: 4/19/2024  Progress Note Due: 4/17/2024  Most Recent Progress Note: 3/18/2024  Date of Surgery: None  Visit # / Visits authorized: 5/ 20   FOTO: 1/3:  Precautions: Standard   Time In: 9:00 am  Time Out: 9:55 am  Total Billable Time: 55 minutes  Precautions: Standard  Subjective   Pt reports: she was very sore after our last session, but after that it felt better, and she was even able to raise her heel off the ground without pain, but then noticed that she stepped wrong and the pain returned.  She was compliant with home exercise program. (90% of the time)  Response to previous treatment: No adverse effect  Functional change: Ongoing  Pain: 1/10  Location: Left ankles   Objective   Gait: Peggy Moe ambulates with decreased subtalar joint range of motion into eversion, with decreased bilateral talocrural dorsiflexion.    Peggy Moe received therapeutic exercises to develop strength, endurance, and ROM for 8 minutes including:  Half-Kneeling Dorsiflexion Self-Mobilization, 15x5" hold  Objective measures taken (see above)  Self-Subtalar Joint Mobilizations      Peggy Moe received the following manual therapy techniques: Joint mobilizations and Manual traction were applied for 26 minutes, including:  (L) Talocrural Joint Distraction Mobilization, Grade V  (L) Subtalar Eversion Sidelying Manipulation  Great Toe Mobilization, Grade IV  Grade V Medial Cuneiform Manipulation  Grade V Lumbar Gapping Mobilization to " "L5-S1  Objective measures taken (see above)    Peggy Moe participated in neuromuscular re-education activities to improve: Balance, Coordination, Kinesthetic, Sense, Proprioception, and Posture for 21 minutes. The following activities were included:  Clamshells, yellow band, 2x12, bilateral, 5" hold  Bridge with Marching 2x12    Peggy Moe participated in dynamic functional therapeutic activities to improve functional performance for 00  minutes, includin    Home Exercises Provided and Patient Education Provided     Education provided:   - Home exercise program     Written Home Exercises Provided: Patient instructed to cont prior HEP.  Exercises were reviewed and Peggy Moe was able to demonstrate them prior to the end of the session.  Peggy Moe demonstrated good  understanding of the education provided.     See EMR under Patient Instructions for exercises provided prior visit.    Assessment   Peggy Moe continues to demonstrate decreased talocrural and subtalar joint mobility, greater on the right than on the left side. Straight-leg raise test with peroneal and sural nerve bias did not yield positive results for concordant symptoms. Increased proximal stability training as well as addressing hypomobility of the lumbar spine L5-S1 segments improved pain to nearly 0/10 after completion. M.E. will benefit from a customized physical therapy plan of care  to address the aforementioned impairments in an effort to improve human function and quality of life, but was encouraged to also follow up with referring physician due to a plateau in progress and worsening of anterior symptoms.        Peggy Moe Is progressing well towards her goals.   Pt prognosis is Good.     Pt will continue to benefit from skilled outpatient physical therapy to address the deficits listed in the problem list box on initial evaluation, provide pt/family education and to maximize pt's level of independence in the home " and community environment.     Pt's spiritual, cultural and educational needs considered and pt agreeable to plan of care and goals.     Anticipated barriers to physical therapy: None    Goals: Short Term Goals: 2 weeks   1.) Patient will demonstrate independence in compliance and technique of home exercise program provided as per teach-back method of assessment. Ongoing  2.) Patient will demonstrate a 8-point improvement as per FOTO score to demonstrate improvements in perceived functional ability. Ongoing  3.) Peggy will note a pain level of no greater than 2/10 after 20 minutes of ambulating her dog. Ongoing  4.) Peggy will navigate 3 flights of stairs without any increased pain >2/10. Ongoing        Long Term Goals: 4 weeks   1.) Patient will demonstrate independence in compliance and technique of home exercise program provided as per teach-back method of assessment. Ongoing  2.) Patient will demonstrate a 8-point improvement as per FOTO score to demonstrate improvements in perceived functional ability. Ongoing  3.) Peggy will note a pain level of no greater than 2/10 after 20 minutes of ambulating her dog. Ongoing  4.) Peggy will navigate 3 flights of stairs without any increased pain >2/10. Ongoing  Plan     Continue to progress as per plan of care.    Ann Valdovinos, PT DPT  Board Certified in Orthopedic Physical Therapy       [phone]